# Patient Record
Sex: MALE | Race: WHITE | NOT HISPANIC OR LATINO | Employment: FULL TIME | URBAN - METROPOLITAN AREA
[De-identification: names, ages, dates, MRNs, and addresses within clinical notes are randomized per-mention and may not be internally consistent; named-entity substitution may affect disease eponyms.]

---

## 2021-12-08 RX ORDER — SACUBITRIL AND VALSARTAN 49; 51 MG/1; MG/1
1 TABLET, FILM COATED ORAL 2 TIMES DAILY
COMMUNITY
End: 2021-12-08 | Stop reason: SDUPTHER

## 2021-12-08 RX ORDER — ROSUVASTATIN CALCIUM 40 MG/1
10 TABLET, COATED ORAL NIGHTLY
COMMUNITY
End: 2021-12-08 | Stop reason: SDUPTHER

## 2021-12-08 RX ORDER — CARVEDILOL 6.25 MG/1
6.25 TABLET ORAL 2 TIMES DAILY
Qty: 180 TABLET | Refills: 3 | Status: SHIPPED | OUTPATIENT
Start: 2021-12-08 | End: 2022-01-03 | Stop reason: SDUPTHER

## 2021-12-08 RX ORDER — CARVEDILOL 6.25 MG/1
6.25 TABLET ORAL 2 TIMES DAILY
COMMUNITY
End: 2021-12-08 | Stop reason: SDUPTHER

## 2021-12-08 RX ORDER — ROSUVASTATIN CALCIUM 40 MG/1
40 TABLET, COATED ORAL NIGHTLY
Qty: 90 TABLET | Refills: 3 | Status: SHIPPED | OUTPATIENT
Start: 2021-12-08 | End: 2022-01-03 | Stop reason: SDUPTHER

## 2021-12-08 RX ORDER — FUROSEMIDE 40 MG/1
40 TABLET ORAL 2 TIMES DAILY
COMMUNITY
End: 2021-12-08 | Stop reason: SDUPTHER

## 2021-12-08 RX ORDER — FUROSEMIDE 40 MG/1
40 TABLET ORAL 2 TIMES DAILY
Qty: 180 TABLET | Refills: 3 | Status: SHIPPED | OUTPATIENT
Start: 2021-12-08 | End: 2022-01-03 | Stop reason: SDUPTHER

## 2021-12-08 RX ORDER — SACUBITRIL AND VALSARTAN 49; 51 MG/1; MG/1
1 TABLET, FILM COATED ORAL 2 TIMES DAILY
Qty: 180 TABLET | Refills: 3 | Status: SHIPPED | OUTPATIENT
Start: 2021-12-08 | End: 2022-01-03 | Stop reason: SDUPTHER

## 2021-12-08 RX ORDER — LANOLIN ALCOHOL/MO/W.PET/CERES
400 CREAM (GRAM) TOPICAL DAILY
Qty: 90 TABLET | Refills: 3 | Status: SHIPPED | OUTPATIENT
Start: 2021-12-08 | End: 2022-01-03

## 2021-12-08 RX ORDER — LANOLIN ALCOHOL/MO/W.PET/CERES
400 CREAM (GRAM) TOPICAL DAILY
COMMUNITY
End: 2021-12-08 | Stop reason: SDUPTHER

## 2021-12-08 NOTE — TELEPHONE ENCOUNTER
----- Message from Omar Saucedo sent at 12/8/2021  8:28 AM CST -----  Contact: pt  Pt asking for refills on his meds he is out of his entrestor please call back to advise he was last seen at dr garza [facility] location   [phone]  [facility] [address] [phone]

## 2022-01-03 ENCOUNTER — OFFICE VISIT (OUTPATIENT)
Dept: CARDIOLOGY | Facility: CLINIC | Age: 52
End: 2022-01-03
Payer: COMMERCIAL

## 2022-01-03 VITALS
HEIGHT: 68 IN | WEIGHT: 221 LBS | HEART RATE: 105 BPM | RESPIRATION RATE: 16 BRPM | BODY MASS INDEX: 33.49 KG/M2 | DIASTOLIC BLOOD PRESSURE: 74 MMHG | OXYGEN SATURATION: 97 % | SYSTOLIC BLOOD PRESSURE: 118 MMHG

## 2022-01-03 DIAGNOSIS — I45.4 IVCD (INTRAVENTRICULAR CONDUCTION DEFECT): ICD-10-CM

## 2022-01-03 DIAGNOSIS — G47.33 OSA (OBSTRUCTIVE SLEEP APNEA): ICD-10-CM

## 2022-01-03 DIAGNOSIS — I10 HYPERTENSION, UNSPECIFIED TYPE: Primary | ICD-10-CM

## 2022-01-03 DIAGNOSIS — C44.1192 BASAL CELL CARCINOMA (BCC) OF SKIN OF LEFT LOWER EYELID INCLUDING CANTHUS: ICD-10-CM

## 2022-01-03 DIAGNOSIS — I42.0 CONGESTIVE CARDIOMYOPATHY: ICD-10-CM

## 2022-01-03 DIAGNOSIS — I48.0 PAROXYSMAL ATRIAL FIBRILLATION: ICD-10-CM

## 2022-01-03 DIAGNOSIS — I63.9 CEREBROVASCULAR ACCIDENT (CVA), UNSPECIFIED MECHANISM: ICD-10-CM

## 2022-01-03 PROCEDURE — 93000 EKG 12-LEAD: ICD-10-PCS | Mod: S$GLB,,, | Performed by: GENERAL PRACTICE

## 2022-01-03 PROCEDURE — 99203 OFFICE O/P NEW LOW 30 MIN: CPT | Mod: S$GLB,,, | Performed by: GENERAL PRACTICE

## 2022-01-03 PROCEDURE — 99203 PR OFFICE/OUTPT VISIT, NEW, LEVL III, 30-44 MIN: ICD-10-PCS | Mod: S$GLB,,, | Performed by: GENERAL PRACTICE

## 2022-01-03 PROCEDURE — 93000 ELECTROCARDIOGRAM COMPLETE: CPT | Mod: S$GLB,,, | Performed by: GENERAL PRACTICE

## 2022-01-03 RX ORDER — FUROSEMIDE 40 MG/1
40 TABLET ORAL 2 TIMES DAILY
Qty: 180 TABLET | Refills: 3 | Status: SHIPPED | OUTPATIENT
Start: 2022-01-03 | End: 2023-02-10

## 2022-01-03 RX ORDER — LANOLIN ALCOHOL/MO/W.PET/CERES
100 CREAM (GRAM) TOPICAL DAILY
COMMUNITY

## 2022-01-03 RX ORDER — LANOLIN ALCOHOL/MO/W.PET/CERES
400 CREAM (GRAM) TOPICAL DAILY
Qty: 30 TABLET | Refills: 3 | Status: SHIPPED | OUTPATIENT
Start: 2022-01-03 | End: 2022-04-03

## 2022-01-03 RX ORDER — UBIDECARENONE 30 MG
100 CAPSULE ORAL DAILY
Qty: 90 CAPSULE | Refills: 3 | Status: SHIPPED | OUTPATIENT
Start: 2022-01-03 | End: 2023-05-12 | Stop reason: SDUPTHER

## 2022-01-03 RX ORDER — ROSUVASTATIN CALCIUM 40 MG/1
40 TABLET, COATED ORAL NIGHTLY
Qty: 90 TABLET | Refills: 3 | Status: SHIPPED | OUTPATIENT
Start: 2022-01-03 | End: 2023-02-10

## 2022-01-03 RX ORDER — MULTIVITAMIN
1 TABLET ORAL DAILY
COMMUNITY

## 2022-01-03 RX ORDER — SACUBITRIL AND VALSARTAN 49; 51 MG/1; MG/1
1 TABLET, FILM COATED ORAL 2 TIMES DAILY
Qty: 180 TABLET | Refills: 3 | Status: SHIPPED | OUTPATIENT
Start: 2022-01-03 | End: 2023-02-10

## 2022-01-03 RX ORDER — SPIRONOLACTONE 50 MG/1
50 TABLET, FILM COATED ORAL DAILY
COMMUNITY
End: 2022-06-20

## 2022-01-03 RX ORDER — UBIDECARENONE 30 MG
30 CAPSULE ORAL 3 TIMES DAILY
COMMUNITY
End: 2022-01-03 | Stop reason: SDUPTHER

## 2022-01-03 RX ORDER — LANOLIN ALCOHOL/MO/W.PET/CERES
400 CREAM (GRAM) TOPICAL DAILY
COMMUNITY
End: 2022-01-03 | Stop reason: SDUPTHER

## 2022-01-03 RX ORDER — ASPIRIN 325 MG
325 TABLET ORAL DAILY
COMMUNITY
End: 2023-04-25

## 2022-01-03 RX ORDER — CARVEDILOL 6.25 MG/1
6.25 TABLET ORAL 2 TIMES DAILY
Qty: 180 TABLET | Refills: 3 | Status: SHIPPED | OUTPATIENT
Start: 2022-01-03 | End: 2023-02-10

## 2022-01-03 NOTE — PROGRESS NOTES
Subjective:    Patient ID:  Sher Ladd is a 51 y.o. male who presents for follow-up of   Chief Complaint   Patient presents with    Hypertension       HPI:  History of infectious AFIB and CVA, CMO, CHF. He had restoration of NSR and LVEF.   Had basal cell carcinoma on chest removed and one on face next month.        Review of patient's allergies indicates:  No Known Allergies    No past medical history on file.  No past surgical history on file.  Social History     Tobacco Use    Smoking status: Never Smoker    Smokeless tobacco: Never Used   Substance Use Topics    Alcohol use: Yes    Drug use: Never     No family history on file.     Review of Systems:   Constitution: Negative for diaphoresis and fever.   HEENT: Negative for nosebleeds.    Cardiovascular: Negative for chest pain       No dyspnea on exertion       No leg swelling        No palpitations  Respiratory: Negative for shortness of breath and wheezing.    Hematologic/Lymphatic: Negative for bleeding problem. Does not bruise/bleed easily.   Skin: Negative for color change and rash.   Musculoskeletal: Negative for falls and myalgias.   Gastrointestinal: Negative for hematemesis and hematochezia.   Genitourinary: Negative for hematuria.   Neurological: Negative for dizziness and light-headedness.   Psychiatric/Behavioral: Negative for altered mental status and memory loss.          Objective:        Vitals:    01/03/22 1445   BP: 118/74   Pulse: 105   Resp: 16       No results found for: WBC, HGB, HCT, PLT, CHOL, TRIG, HDL, LDLDIRECT, ALT, AST, NA, K, CL, CREATININE, BUN, CO2, TSH, PSA, INR, GLUF, HGBA1C, MICROALBUR     ECHOCARDIOGRAM RESULTS  No results found for this or any previous visit.        CURRENT/PREVIOUS VISIT EKG  No results found for this or any previous visit.  No valid procedures specified.   No results found for this or any previous visit.      Physical Exam:  CONSTITUTIONAL: No fever, no chills  HEENT: Normocephalic, atraumatic,pupils  reactive to light                 NECK:  No JVD no carotid bruit  CVS: S1S2+, RRR, no murmurs,   LUNGS: Clear  ABDOMEN: Soft, NT, BS+  EXTREMITIES: No cyanosis, edema  : No rivera catheter  NEURO: AAO X 3  PSY: Normal affect      Medication List with Changes/Refills   Current Medications    ASPIRIN 325 MG TABLET    Take 325 mg by mouth once daily.    CALCIUM CARBONATE/VITAMIN D3 (VITAMIN D-3 ORAL)    Take 5,000 each by mouth once daily.    CYANOCOBALAMIN (VITAMIN B-12) 1000 MCG TABLET    Take 100 mcg by mouth once daily.    MULTIVITAMIN (THERAGRAN) PER TABLET    Take 1 tablet by mouth once daily.    SPIRONOLACTONE (ALDACTONE) 50 MG TABLET    Take 50 mg by mouth once daily.   Changed and/or Refilled Medications    Modified Medication Previous Medication    CARVEDILOL (COREG) 6.25 MG TABLET carvediloL (COREG) 6.25 MG tablet       Take 1 tablet (6.25 mg total) by mouth 2 (two) times daily.    Take 1 tablet (6.25 mg total) by mouth 2 (two) times daily.    CO-ENZYME Q-10 30 MG CAPSULE co-enzyme Q-10 30 mg capsule       Take 3 capsules (90 mg total) by mouth once daily.    Take 30 mg by mouth 3 (three) times daily.    FUROSEMIDE (LASIX) 40 MG TABLET furosemide (LASIX) 40 MG tablet       Take 1 tablet (40 mg total) by mouth 2 (two) times daily.    Take 1 tablet (40 mg total) by mouth 2 (two) times daily.    MAGNESIUM OXIDE (MAGOX) 400 MG (241.3 MG MAGNESIUM) TABLET magnesium oxide (MAGOX) 400 mg (241.3 mg magnesium) tablet       Take 1 tablet (400 mg total) by mouth once daily.    Take 400 mg by mouth once daily.    ROSUVASTATIN (CRESTOR) 40 MG TAB rosuvastatin (CRESTOR) 40 MG Tab       Take 1 tablet (40 mg total) by mouth every evening.    Take 1 tablet (40 mg total) by mouth every evening.    SACUBITRIL-VALSARTAN (ENTRESTO) 49-51 MG PER TABLET sacubitriL-valsartan (ENTRESTO) 49-51 mg per tablet       Take 1 tablet by mouth 2 (two) times daily.    Take 1 tablet by mouth 2 (two) times daily.   Discontinued Medications     MAGNESIUM OXIDE (MAG-OX) 400 MG (241.3 MG MAGNESIUM) TABLET    Take 1 tablet (400 mg total) by mouth once daily.             Assessment:       1. Hypertension, unspecified type    2. Paroxysmal atrial fibrillation    3. Congestive cardiomyopathy    4. Cerebrovascular accident (CVA), unspecified mechanism    5. IVCD (intraventricular conduction defect)    6. BMI 33.0-33.9,adult    7. KEENAN (obstructive sleep apnea)         Plan:     Problem List Items Addressed This Visit    None     Visit Diagnoses     Hypertension, unspecified type    -  Primary    Relevant Orders    IN OFFICE EKG 12-LEAD (to [facility])    TSH    Lipid Panel    Hemoglobin A1C    CBC Without Differential    Comprehensive Metabolic Panel    Echo    Paroxysmal atrial fibrillation        Relevant Orders    TSH    Lipid Panel    Hemoglobin A1C    CBC Without Differential    Comprehensive Metabolic Panel    Echo    Congestive cardiomyopathy        Relevant Orders    TSH    Lipid Panel    Hemoglobin A1C    CBC Without Differential    Comprehensive Metabolic Panel    Echo    Cerebrovascular accident (CVA), unspecified mechanism        Relevant Orders    TSH    Lipid Panel    Hemoglobin A1C    CBC Without Differential    Comprehensive Metabolic Panel    IVCD (intraventricular conduction defect)        Relevant Orders    TSH    Lipid Panel    Hemoglobin A1C    CBC Without Differential    Comprehensive Metabolic Panel    BMI 33.0-33.9,adult        Relevant Orders    TSH    Lipid Panel    Hemoglobin A1C    CBC Without Differential    Comprehensive Metabolic Panel    KEENAN (obstructive sleep apnea)        Relevant Orders    Ambulatory referral/consult to Sleep Disorders    Echo        Echo for LV function baseline. Last ECHO ,<- 1 year.   6 month bloodwork.    Cardiomyopathy ,TACHYCARDIC SECONDARY TO ATRIAL FIBRILLATION with embolic CVA.  FC 1  No CHF, CP    CHADS VASC 2 =4 DISCUSSED NOAC with patient. He doesn't want to restart new medicines.          Follow up in  about 6 months (around 7/3/2022).    The patients questions were answered, they verbalized understanding, and agreed with the treatment plan.     SILVA PATTERSON MD  [facility]

## 2022-06-04 ENCOUNTER — TELEPHONE (OUTPATIENT)
Dept: CARDIOLOGY | Facility: CLINIC | Age: 52
End: 2022-06-04
Payer: COMMERCIAL

## 2022-06-04 NOTE — TELEPHONE ENCOUNTER
Left message for patient that echo had to be reschedule from 6/16 to 7/7/22 due to no tech that day.

## 2022-06-20 ENCOUNTER — TELEPHONE (OUTPATIENT)
Dept: CARDIOLOGY | Facility: CLINIC | Age: 52
End: 2022-06-20
Payer: COMMERCIAL

## 2023-01-18 ENCOUNTER — LAB VISIT (OUTPATIENT)
Dept: FAMILY MEDICINE | Facility: CLINIC | Age: 53
End: 2023-01-18
Payer: COMMERCIAL

## 2023-01-18 DIAGNOSIS — I63.9 CEREBROVASCULAR ACCIDENT (CVA), UNSPECIFIED MECHANISM: ICD-10-CM

## 2023-01-18 DIAGNOSIS — I48.0 PAROXYSMAL ATRIAL FIBRILLATION: ICD-10-CM

## 2023-01-18 DIAGNOSIS — I10 HYPERTENSION, UNSPECIFIED TYPE: ICD-10-CM

## 2023-01-18 DIAGNOSIS — I42.0 CONGESTIVE CARDIOMYOPATHY: ICD-10-CM

## 2023-01-18 DIAGNOSIS — I45.4 IVCD (INTRAVENTRICULAR CONDUCTION DEFECT): ICD-10-CM

## 2023-01-18 LAB
ALBUMIN SERPL BCP-MCNC: 4.4 G/DL (ref 3.5–5.2)
ALP SERPL-CCNC: 52 U/L (ref 55–135)
ALT SERPL W/O P-5'-P-CCNC: 35 U/L (ref 10–44)
ANION GAP SERPL CALC-SCNC: 12 MMOL/L (ref 8–16)
AST SERPL-CCNC: 21 U/L (ref 10–40)
BILIRUB SERPL-MCNC: 0.5 MG/DL (ref 0.1–1)
BUN SERPL-MCNC: 15 MG/DL (ref 6–20)
CALCIUM SERPL-MCNC: 9.8 MG/DL (ref 8.7–10.5)
CHLORIDE SERPL-SCNC: 104 MMOL/L (ref 95–110)
CHOLEST SERPL-MCNC: 176 MG/DL (ref 120–199)
CHOLEST/HDLC SERPL: 3.7 {RATIO} (ref 2–5)
CO2 SERPL-SCNC: 26 MMOL/L (ref 23–29)
CREAT SERPL-MCNC: 1 MG/DL (ref 0.5–1.4)
ERYTHROCYTE [DISTWIDTH] IN BLOOD BY AUTOMATED COUNT: 13.2 % (ref 11.5–14.5)
EST. GFR  (NO RACE VARIABLE): >60 ML/MIN/1.73 M^2
ESTIMATED AVG GLUCOSE: 131 MG/DL (ref 68–131)
GLUCOSE SERPL-MCNC: 128 MG/DL (ref 70–110)
HBA1C MFR BLD: 6.2 % (ref 4–5.6)
HCT VFR BLD AUTO: 45.8 % (ref 40–54)
HDLC SERPL-MCNC: 48 MG/DL (ref 40–75)
HDLC SERPL: 27.3 % (ref 20–50)
HGB BLD-MCNC: 14.8 G/DL (ref 14–18)
LDLC SERPL CALC-MCNC: 101.6 MG/DL (ref 63–159)
MCH RBC QN AUTO: 28.8 PG (ref 27–31)
MCHC RBC AUTO-ENTMCNC: 32.3 G/DL (ref 32–36)
MCV RBC AUTO: 89 FL (ref 82–98)
NONHDLC SERPL-MCNC: 128 MG/DL
PLATELET # BLD AUTO: 377 K/UL (ref 150–450)
PMV BLD AUTO: 9.6 FL (ref 9.2–12.9)
POTASSIUM SERPL-SCNC: 4.5 MMOL/L (ref 3.5–5.1)
PROT SERPL-MCNC: 7.6 G/DL (ref 6–8.4)
RBC # BLD AUTO: 5.13 M/UL (ref 4.6–6.2)
SODIUM SERPL-SCNC: 142 MMOL/L (ref 136–145)
TRIGL SERPL-MCNC: 132 MG/DL (ref 30–150)
TSH SERPL DL<=0.005 MIU/L-ACNC: 3.43 UIU/ML (ref 0.4–4)
WBC # BLD AUTO: 6.51 K/UL (ref 3.9–12.7)

## 2023-01-18 PROCEDURE — 84443 ASSAY THYROID STIM HORMONE: CPT | Performed by: GENERAL PRACTICE

## 2023-01-18 PROCEDURE — 83036 HEMOGLOBIN GLYCOSYLATED A1C: CPT | Performed by: GENERAL PRACTICE

## 2023-01-18 PROCEDURE — 80061 LIPID PANEL: CPT | Performed by: GENERAL PRACTICE

## 2023-01-18 PROCEDURE — 85027 COMPLETE CBC AUTOMATED: CPT | Performed by: GENERAL PRACTICE

## 2023-01-18 PROCEDURE — 80053 COMPREHEN METABOLIC PANEL: CPT | Performed by: GENERAL PRACTICE

## 2023-01-25 ENCOUNTER — OFFICE VISIT (OUTPATIENT)
Dept: CARDIOLOGY | Facility: CLINIC | Age: 53
End: 2023-01-25
Payer: COMMERCIAL

## 2023-01-25 VITALS
WEIGHT: 232.5 LBS | BODY MASS INDEX: 35.35 KG/M2 | HEART RATE: 98 BPM | SYSTOLIC BLOOD PRESSURE: 104 MMHG | DIASTOLIC BLOOD PRESSURE: 74 MMHG | OXYGEN SATURATION: 96 %

## 2023-01-25 DIAGNOSIS — I42.0 CONGESTIVE CARDIOMYOPATHY: ICD-10-CM

## 2023-01-25 DIAGNOSIS — I45.4 IVCD (INTRAVENTRICULAR CONDUCTION DEFECT): ICD-10-CM

## 2023-01-25 DIAGNOSIS — R73.03 PREDIABETES: ICD-10-CM

## 2023-01-25 DIAGNOSIS — I10 PRIMARY HYPERTENSION: ICD-10-CM

## 2023-01-25 DIAGNOSIS — I48.0 PAROXYSMAL ATRIAL FIBRILLATION: ICD-10-CM

## 2023-01-25 DIAGNOSIS — L40.9 PSORIASIS (A TYPE OF SKIN INFLAMMATION): ICD-10-CM

## 2023-01-25 DIAGNOSIS — K21.9 GASTROESOPHAGEAL REFLUX DISEASE WITHOUT ESOPHAGITIS: Primary | ICD-10-CM

## 2023-01-25 DIAGNOSIS — I63.9 CEREBROVASCULAR ACCIDENT (CVA), UNSPECIFIED MECHANISM: ICD-10-CM

## 2023-01-25 PROCEDURE — 1160F RVW MEDS BY RX/DR IN RCRD: CPT | Mod: CPTII,S$GLB,, | Performed by: GENERAL PRACTICE

## 2023-01-25 PROCEDURE — 1159F PR MEDICATION LIST DOCUMENTED IN MEDICAL RECORD: ICD-10-PCS | Mod: CPTII,S$GLB,, | Performed by: GENERAL PRACTICE

## 2023-01-25 PROCEDURE — 1160F PR REVIEW ALL MEDS BY PRESCRIBER/CLIN PHARMACIST DOCUMENTED: ICD-10-PCS | Mod: CPTII,S$GLB,, | Performed by: GENERAL PRACTICE

## 2023-01-25 PROCEDURE — 1159F MED LIST DOCD IN RCRD: CPT | Mod: CPTII,S$GLB,, | Performed by: GENERAL PRACTICE

## 2023-01-25 PROCEDURE — 3008F PR BODY MASS INDEX (BMI) DOCUMENTED: ICD-10-PCS | Mod: CPTII,S$GLB,, | Performed by: GENERAL PRACTICE

## 2023-01-25 PROCEDURE — 99214 PR OFFICE/OUTPT VISIT, EST, LEVL IV, 30-39 MIN: ICD-10-PCS | Mod: S$GLB,,, | Performed by: GENERAL PRACTICE

## 2023-01-25 PROCEDURE — 3008F BODY MASS INDEX DOCD: CPT | Mod: CPTII,S$GLB,, | Performed by: GENERAL PRACTICE

## 2023-01-25 PROCEDURE — 3078F DIAST BP <80 MM HG: CPT | Mod: CPTII,S$GLB,, | Performed by: GENERAL PRACTICE

## 2023-01-25 PROCEDURE — 3078F PR MOST RECENT DIASTOLIC BLOOD PRESSURE < 80 MM HG: ICD-10-PCS | Mod: CPTII,S$GLB,, | Performed by: GENERAL PRACTICE

## 2023-01-25 PROCEDURE — 3074F SYST BP LT 130 MM HG: CPT | Mod: CPTII,S$GLB,, | Performed by: GENERAL PRACTICE

## 2023-01-25 PROCEDURE — 93000 EKG 12-LEAD: ICD-10-PCS | Mod: S$GLB,,, | Performed by: INTERNAL MEDICINE

## 2023-01-25 PROCEDURE — 3074F PR MOST RECENT SYSTOLIC BLOOD PRESSURE < 130 MM HG: ICD-10-PCS | Mod: CPTII,S$GLB,, | Performed by: GENERAL PRACTICE

## 2023-01-25 PROCEDURE — 99999 PR PBB SHADOW E&M-EST. PATIENT-LVL III: ICD-10-PCS | Mod: PBBFAC,,, | Performed by: GENERAL PRACTICE

## 2023-01-25 PROCEDURE — 93000 ELECTROCARDIOGRAM COMPLETE: CPT | Mod: S$GLB,,, | Performed by: INTERNAL MEDICINE

## 2023-01-25 PROCEDURE — 99214 OFFICE O/P EST MOD 30 MIN: CPT | Mod: S$GLB,,, | Performed by: GENERAL PRACTICE

## 2023-01-25 PROCEDURE — 99999 PR PBB SHADOW E&M-EST. PATIENT-LVL III: CPT | Mod: PBBFAC,,, | Performed by: GENERAL PRACTICE

## 2023-01-25 PROCEDURE — 3044F HG A1C LEVEL LT 7.0%: CPT | Mod: CPTII,S$GLB,, | Performed by: GENERAL PRACTICE

## 2023-01-25 PROCEDURE — 3044F PR MOST RECENT HEMOGLOBIN A1C LEVEL <7.0%: ICD-10-PCS | Mod: CPTII,S$GLB,, | Performed by: GENERAL PRACTICE

## 2023-01-25 RX ORDER — MELOXICAM 15 MG/1
15 TABLET ORAL
COMMUNITY
Start: 2021-12-31 | End: 2023-04-25

## 2023-01-25 RX ORDER — MOMETASONE FUROATE 1 MG/G
OINTMENT TOPICAL DAILY
COMMUNITY
Start: 2023-01-25 | End: 2023-01-25 | Stop reason: SDUPTHER

## 2023-01-25 RX ORDER — OMEPRAZOLE 40 MG/1
40 CAPSULE, DELAYED RELEASE ORAL DAILY
Qty: 30 CAPSULE | Refills: 11 | Status: SHIPPED | OUTPATIENT
Start: 2023-01-25 | End: 2023-02-02

## 2023-01-25 RX ORDER — MOMETASONE FUROATE 1 MG/G
OINTMENT TOPICAL DAILY
Qty: 45 G | Refills: 2 | Status: SHIPPED | OUTPATIENT
Start: 2023-01-25

## 2023-01-25 NOTE — PROGRESS NOTES
Subjective:    Patient ID:  Uday Ladd is a 52 y.o. male who presents for follow-up of No chief complaint on file.      HPI:  History of infectious AFIB and CVA, CMO, CHF. He had restoration of NSR and LVEF.   Had basal cell carcinoma on chest removed and one on face next month.          Echo for LV function baseline. Last ECHO ,<- 1 year.   6 month bloodwork.     Cardiomyopathy ,TACHYCARDIC SECONDARY TO ATRIAL FIBRILLATION with embolic CVA.  FC 1  No CHF, CP     CHADS VASC 2 =4 DISCUSSED NOAC with patient. He doesn't want to restart new medicines.       1/25/23    Uday Ladd is a 45 y.o. male who presents WITH CHF, IDIOPATHIC CMO AFTER FLU symptoms.Had apical thrombus with cva symptoms treated with antocoagulan greater than 6 months agot . Now admitted for catherization to [facility] of CHF, cardiomyopathy.INITIAL HP LOST. This is redo, some details may be inaccurate..  hE HAS SOME RECOVERY OF LVEF, BUT ANT APICAL AKINESIA ON ECHO C/W CAD VS EMBOLIC EVENT.Thrombus resolved on echo.    Last heart cath 2015 [place].    EKG NSR IVCD.     Right sided chest pains. SOB AT NIGHT WHEN LAYS DOWN. Gained 20 pounds in few weeks. Concerned about fluid.   ACID REFLUX severe food sticks in chest.6 months.      Review of patient's allergies indicates:  No Known Allergies    No past medical history on file.  No past surgical history on file.  Social History     Tobacco Use    Smoking status: Never    Smokeless tobacco: Never   Substance Use Topics    Alcohol use: Yes    Drug use: Never     No family history on file.     Review of Systems:   Constitution: Negative for diaphoresis and fever.   HEENT: Negative for nosebleeds.    Cardiovascular: Negative for chest pain       No dyspnea on exertion       No leg swelling        No palpitations  Respiratory: Negative for shortness of breath and wheezing.    Hematologic/Lymphatic: Negative for bleeding problem. Does not bruise/bleed easily.   Skin: Negative for color change  and rash.   Musculoskeletal: Negative for falls and myalgias.   Gastrointestinal: Negative for hematemesis and hematochezia.   Genitourinary: Negative for hematuria.   Neurological: Negative for dizziness and light-headedness.   Psychiatric/Behavioral: Negative for altered mental status and memory loss.          Objective:        There were no vitals filed for this visit.    Lab Results   Component Value Date    WBC 6.51 01/18/2023    HGB 14.8 01/18/2023    HCT 45.8 01/18/2023     01/18/2023    CHOL 176 01/18/2023    TRIG 132 01/18/2023    HDL 48 01/18/2023    ALT 35 01/18/2023    AST 21 01/18/2023     01/18/2023    K 4.5 01/18/2023     01/18/2023    CREATININE 1.0 01/18/2023    BUN 15 01/18/2023    CO2 26 01/18/2023    TSH 3.433 01/18/2023    HGBA1C 6.2 (H) 01/18/2023          ECHOCARDIOGRAM RESULTS  No results found for this or any previous visit.        CURRENT/PREVIOUS VISIT EKG  Results for orders placed or performed in visit on 01/03/22   IN OFFICE EKG 12-LEAD (to [place])    Collection Time: 01/03/22  2:38 PM    Narrative    Test Reason : I10,    Vent. Rate : 106 BPM     Atrial Rate : 106 BPM     P-R Int : 166 ms          QRS Dur : 126 ms      QT Int : 360 ms       P-R-T Axes : 059 -29 067 degrees     QTc Int : 478 ms    Sinus tachycardia  Nonspecific intraventricular block  Abnormal ECG  No previous ECGs available  Confirmed by Kiersten MEJIA, Librado MELENDEZ ([id number]) on 1/11/2022 8:53:07 PM    Referred By:  KIERSTEN           Confirmed By:Librado Burhc MD     No valid procedures specified.   No results found for this or any previous visit.      Physical Exam:  CONSTITUTIONAL: No fever, no chills  HEENT: Normocephalic, atraumatic,pupils reactive to light                 NECK:  No JVD no carotid bruit  CVS: S1S2+, RRR, no murmurs,   LUNGS: Clear  ABDOMEN: Soft, NT, BS+  EXTREMITIES: No cyanosis, edema  : No rivera catheter  NEURO: AAO X 3  PSY: Normal affect      Medication List with Changes/Refills    Current Medications    ASPIRIN 325 MG TABLET    Take 325 mg by mouth once daily.    CALCIUM CARBONATE/VITAMIN D3 (VITAMIN D-3 ORAL)    Take 5,000 each by mouth once daily.    CARVEDILOL (COREG) 6.25 MG TABLET    Take 1 tablet (6.25 mg total) by mouth 2 (two) times daily.    CO-ENZYME Q-10 30 MG CAPSULE    Take 3 capsules (90 mg total) by mouth once daily.    CYANOCOBALAMIN (VITAMIN B-12) 1000 MCG TABLET    Take 100 mcg by mouth once daily.    FUROSEMIDE (LASIX) 40 MG TABLET    Take 1 tablet (40 mg total) by mouth 2 (two) times daily.    MULTIVITAMIN (THERAGRAN) PER TABLET    Take 1 tablet by mouth once daily.    ROSUVASTATIN (CRESTOR) 40 MG TAB    Take 1 tablet (40 mg total) by mouth every evening.    SACUBITRIL-VALSARTAN (ENTRESTO) 49-51 MG PER TABLET    Take 1 tablet by mouth 2 (two) times daily.    SPIRONOLACTONE (ALDACTONE) 50 MG TABLET    TAKE 1 TABLET BY MOUTH EVERY DAY             Assessment:       1. Primary hypertension    2. Paroxysmal atrial fibrillation    3. Congestive cardiomyopathy    4. Cerebrovascular accident (CVA), unspecified mechanism    5. IVCD (intraventricular conduction defect)    6. BMI 33.0-33.9,adult         Plan:     Problem List Items Addressed This Visit    None  Visit Diagnoses       Primary hypertension    -  Primary    Paroxysmal atrial fibrillation        Congestive cardiomyopathy        Cerebrovascular accident (CVA), unspecified mechanism        IVCD (intraventricular conduction defect)        BMI 33.0-33.9,adult              [facility]  Stress , echo BNP  OMEPRAZOLE  No follow-ups on file.    The patients questions were answered, they verbalized understanding, and agreed with the treatment plan.     SILVA PATTERSON MD  [facility]

## 2023-02-09 ENCOUNTER — TELEPHONE (OUTPATIENT)
Dept: CARDIOLOGY | Facility: HOSPITAL | Age: 53
End: 2023-02-09

## 2023-02-09 NOTE — TELEPHONE ENCOUNTER
Patient advised, test will be at [facility] ([address] [facility]   Will need to register on the first floor at the main entrance.   Patient advised that arrival time is 6:30am.  Patient advised that he may be here about 3.5-4 hours, and may want to bring something to occupy their time, as there will be periods of waiting.    Patient advised, may take his medications prior to testing if you need to.   Advised if he needs to eat to take his medications, please keep it light, like toast and juice.    Patient advised to avoid all caffeine 12 hours prior to testing.  This includes decaf tea and coffee.    Will provide peanut butter crackers for a snack after stress test.  If patient would prefer something else, please bring a snack from home.    Wear comfortable clothing.   No lotions, oils, or powders to the upper chest area. May wear deodorant.    No metal jewelry, buttons, or zippers to the upper body.  Patient verbalizes understanding of instructions.

## 2023-02-10 ENCOUNTER — HOSPITAL ENCOUNTER (OUTPATIENT)
Dept: CARDIOLOGY | Facility: HOSPITAL | Age: 53
Discharge: HOME | End: 2023-02-10
Attending: GENERAL PRACTICE
Payer: COMMERCIAL

## 2023-02-10 ENCOUNTER — HOSPITAL ENCOUNTER (OUTPATIENT)
Dept: RADIOLOGY | Facility: HOSPITAL | Age: 53
Discharge: HOME | End: 2023-02-10
Attending: GENERAL PRACTICE
Payer: COMMERCIAL

## 2023-02-10 VITALS — HEIGHT: 68 IN | WEIGHT: 232 LBS | BODY MASS INDEX: 35.16 KG/M2

## 2023-02-10 DIAGNOSIS — I10 PRIMARY HYPERTENSION: ICD-10-CM

## 2023-02-10 DIAGNOSIS — I63.9 CEREBROVASCULAR ACCIDENT (CVA), UNSPECIFIED MECHANISM: ICD-10-CM

## 2023-02-10 DIAGNOSIS — I48.0 PAROXYSMAL ATRIAL FIBRILLATION: ICD-10-CM

## 2023-02-10 DIAGNOSIS — I42.0 CONGESTIVE CARDIOMYOPATHY: ICD-10-CM

## 2023-02-10 DIAGNOSIS — I10 PRIMARY HYPERTENSION: Primary | ICD-10-CM

## 2023-02-10 LAB
AORTIC ROOT ANNULUS: 3.3 CM
AORTIC VALVE CUSP SEPERATION: 2.1 CM
AV INDEX (PROSTH): 0.73
AV MEAN GRADIENT: 3 MMHG
AV PEAK GRADIENT: 6 MMHG
AV VALVE AREA: 3.03 CM2
AV VELOCITY RATIO: 0.71
BSA FOR ECHO PROCEDURE: 2.25 M2
CV ECHO LV RWT: 0.5 CM
DOP CALC AO PEAK VEL: 1.19 M/S
DOP CALC AO VTI: 22.2 CM
DOP CALC LVOT AREA: 4.2 CM2
DOP CALC LVOT DIAMETER: 2.3 CM
DOP CALC LVOT PEAK VEL: 0.85 M/S
DOP CALC LVOT STROKE VOLUME: 67.27 CM3
DOP CALCLVOT PEAK VEL VTI: 16.2 CM
E WAVE DECELERATION TIME: 169 MSEC
E/A RATIO: 0.97
E/E' RATIO: 7.44 M/S
ECHO LV POSTERIOR WALL: 1.2 CM (ref 0.6–1.1)
EJECTION FRACTION: 35 %
FRACTIONAL SHORTENING: 18 % (ref 28–44)
INTERVENTRICULAR SEPTUM: 0.89 CM (ref 0.6–1.1)
IVRT: 100 MSEC
LEFT ATRIUM SIZE: 4.3 CM
LEFT INTERNAL DIMENSION IN SYSTOLE: 3.97 CM (ref 2.1–4)
LEFT VENTRICLE DIASTOLIC VOLUME INDEX: 50 ML/M2
LEFT VENTRICLE DIASTOLIC VOLUME: 109 ML
LEFT VENTRICLE MASS INDEX: 83 G/M2
LEFT VENTRICLE SYSTOLIC VOLUME INDEX: 31.6 ML/M2
LEFT VENTRICLE SYSTOLIC VOLUME: 68.8 ML
LEFT VENTRICULAR INTERNAL DIMENSION IN DIASTOLE: 4.82 CM (ref 3.5–6)
LEFT VENTRICULAR MASS: 181.94 G
LV LATERAL E/E' RATIO: 5.58 M/S
LV SEPTAL E/E' RATIO: 11.17 M/S
LVOT MG: 1 MMHG
LVOT MV: 0.55 CM/S
MV PEAK A VEL: 0.69 M/S
MV PEAK E VEL: 0.67 M/S
MV STENOSIS PRESSURE HALF TIME: 57 MS
MV VALVE AREA P 1/2 METHOD: 3.86 CM2
PISA TR MAX VEL: 1.88 M/S
RA PRESSURE: 3 MMHG
RIGHT VENTRICULAR END-DIASTOLIC DIMENSION: 2.13 CM
TDI LATERAL: 0.12 M/S
TDI SEPTAL: 0.06 M/S
TDI: 0.09 M/S
TR MAX PG: 14 MMHG
TV REST PULMONARY ARTERY PRESSURE: 17 MMHG

## 2023-02-10 PROCEDURE — 93017 CV STRESS TEST TRACING ONLY: CPT

## 2023-02-10 PROCEDURE — 93016 PR CARDIAC STRESS TST,DR SUPERV ONLY: ICD-10-PCS | Mod: ,,, | Performed by: NURSE PRACTITIONER

## 2023-02-10 PROCEDURE — 93306 TTE W/DOPPLER COMPLETE: CPT

## 2023-02-10 PROCEDURE — 78452 HT MUSCLE IMAGE SPECT MULT: CPT | Mod: 26,,, | Performed by: GENERAL PRACTICE

## 2023-02-10 PROCEDURE — 93018 NUCLEAR STRESS - CARDIOLOGY INTERPRETED (CUPID ONLY): ICD-10-PCS | Mod: ,,, | Performed by: GENERAL PRACTICE

## 2023-02-10 PROCEDURE — 93018 CV STRESS TEST I&R ONLY: CPT | Mod: ,,, | Performed by: GENERAL PRACTICE

## 2023-02-10 PROCEDURE — A9502 TC99M TETROFOSMIN: HCPCS

## 2023-02-10 PROCEDURE — 78452 NUCLEAR STRESS - CARDIOLOGY INTERPRETED (CUPID ONLY): ICD-10-PCS | Mod: 26,,, | Performed by: GENERAL PRACTICE

## 2023-02-10 PROCEDURE — 93016 CV STRESS TEST SUPVJ ONLY: CPT | Mod: ,,, | Performed by: NURSE PRACTITIONER

## 2023-02-10 PROCEDURE — 93306 TTE W/DOPPLER COMPLETE: CPT | Mod: 26,,, | Performed by: GENERAL PRACTICE

## 2023-02-10 PROCEDURE — 93306 ECHO (CUPID ONLY): ICD-10-PCS | Mod: 26,,, | Performed by: GENERAL PRACTICE

## 2023-02-10 RX ORDER — REGADENOSON 0.08 MG/ML
0.4 INJECTION, SOLUTION INTRAVENOUS ONCE
Status: COMPLETED | OUTPATIENT
Start: 2023-02-10 | End: 2023-02-10

## 2023-02-10 RX ADMIN — REGADENOSON 0.4 MG: 0.08 INJECTION, SOLUTION INTRAVENOUS at 08:02

## 2023-02-13 LAB
CV PHARM DOSE: 0.4 MG
CV STRESS BASE HR: 69 BPM
DIASTOLIC BLOOD PRESSURE: 64 MMHG
EJECTION FRACTION- HIGH: 65 %
END DIASTOLIC INDEX-HIGH: 153 ML/M2
END DIASTOLIC INDEX-LOW: 93 ML/M2
END SYSTOLIC INDEX-HIGH: 71 ML/M2
END SYSTOLIC INDEX-LOW: 31 ML/M2
NUC REST DIASTOLIC VOLUME INDEX: 110
NUC REST EJECTION FRACTION: 52
NUC REST SYSTOLIC VOLUME INDEX: 53
NUC STRESS DIASTOLIC VOLUME INDEX: 134
NUC STRESS EJECTION FRACTION: 43 %
NUC STRESS SYSTOLIC VOLUME INDEX: 76
OHS CV CPX 1 MINUTE RECOVERY HEART RATE: 102 BPM
OHS CV CPX 85 PERCENT MAX PREDICTED HEART RATE MALE: 145
OHS CV CPX MAX PREDICTED HEART RATE: 170
OHS CV CPX PATIENT IS FEMALE: 0
OHS CV CPX PATIENT IS MALE: 1
OHS CV CPX PEAK DIASTOLIC BLOOD PRESSURE: 60 MMHG
OHS CV CPX PEAK HEAR RATE: 102 BPM
OHS CV CPX PEAK RATE PRESSURE PRODUCT: NORMAL
OHS CV CPX PEAK SYSTOLIC BLOOD PRESSURE: 100 MMHG
OHS CV CPX PERCENT MAX PREDICTED HEART RATE ACHIEVED: 60
OHS CV CPX RATE PRESSURE PRODUCT PRESENTING: 7176
RETIRED EF AND QEF - SEE NOTES: 53 %
SYSTOLIC BLOOD PRESSURE: 104 MMHG

## 2023-04-13 ENCOUNTER — PATIENT MESSAGE (OUTPATIENT)
Dept: CARDIOLOGY | Facility: CLINIC | Age: 53
End: 2023-04-13
Payer: COMMERCIAL

## 2023-04-25 ENCOUNTER — OFFICE VISIT (OUTPATIENT)
Dept: CARDIOLOGY | Facility: CLINIC | Age: 53
End: 2023-04-25
Payer: COMMERCIAL

## 2023-04-25 VITALS
BODY MASS INDEX: 36.72 KG/M2 | HEART RATE: 97 BPM | OXYGEN SATURATION: 96 % | WEIGHT: 241.5 LBS | SYSTOLIC BLOOD PRESSURE: 131 MMHG | DIASTOLIC BLOOD PRESSURE: 89 MMHG

## 2023-04-25 DIAGNOSIS — I99.8 ISCHEMIA: ICD-10-CM

## 2023-04-25 DIAGNOSIS — I42.7 DILATED CARDIOMYOPATHY SECONDARY TO DRUG: ICD-10-CM

## 2023-04-25 DIAGNOSIS — R73.03 PREDIABETES: Primary | ICD-10-CM

## 2023-04-25 DIAGNOSIS — R53.82 CHRONIC FATIGUE: ICD-10-CM

## 2023-04-25 DIAGNOSIS — G47.33 OSA (OBSTRUCTIVE SLEEP APNEA): ICD-10-CM

## 2023-04-25 DIAGNOSIS — I63.9 CEREBROVASCULAR ACCIDENT (CVA), UNSPECIFIED MECHANISM: ICD-10-CM

## 2023-04-25 DIAGNOSIS — I10 PRIMARY HYPERTENSION: ICD-10-CM

## 2023-04-25 PROCEDURE — 1159F PR MEDICATION LIST DOCUMENTED IN MEDICAL RECORD: ICD-10-PCS | Mod: CPTII,S$GLB,, | Performed by: GENERAL PRACTICE

## 2023-04-25 PROCEDURE — 3008F BODY MASS INDEX DOCD: CPT | Mod: CPTII,S$GLB,, | Performed by: GENERAL PRACTICE

## 2023-04-25 PROCEDURE — 3075F PR MOST RECENT SYSTOLIC BLOOD PRESS GE 130-139MM HG: ICD-10-PCS | Mod: CPTII,S$GLB,, | Performed by: GENERAL PRACTICE

## 2023-04-25 PROCEDURE — 4010F ACE/ARB THERAPY RXD/TAKEN: CPT | Mod: CPTII,S$GLB,, | Performed by: GENERAL PRACTICE

## 2023-04-25 PROCEDURE — 3044F HG A1C LEVEL LT 7.0%: CPT | Mod: CPTII,S$GLB,, | Performed by: GENERAL PRACTICE

## 2023-04-25 PROCEDURE — 3075F SYST BP GE 130 - 139MM HG: CPT | Mod: CPTII,S$GLB,, | Performed by: GENERAL PRACTICE

## 2023-04-25 PROCEDURE — 4010F PR ACE/ARB THEARPY RXD/TAKEN: ICD-10-PCS | Mod: CPTII,S$GLB,, | Performed by: GENERAL PRACTICE

## 2023-04-25 PROCEDURE — 99215 PR OFFICE/OUTPT VISIT, EST, LEVL V, 40-54 MIN: ICD-10-PCS | Mod: S$GLB,,, | Performed by: GENERAL PRACTICE

## 2023-04-25 PROCEDURE — 99999 PR PBB SHADOW E&M-EST. PATIENT-LVL III: ICD-10-PCS | Mod: PBBFAC,,, | Performed by: GENERAL PRACTICE

## 2023-04-25 PROCEDURE — 99999 PR PBB SHADOW E&M-EST. PATIENT-LVL III: CPT | Mod: PBBFAC,,, | Performed by: GENERAL PRACTICE

## 2023-04-25 PROCEDURE — 3079F PR MOST RECENT DIASTOLIC BLOOD PRESSURE 80-89 MM HG: ICD-10-PCS | Mod: CPTII,S$GLB,, | Performed by: GENERAL PRACTICE

## 2023-04-25 PROCEDURE — 1159F MED LIST DOCD IN RCRD: CPT | Mod: CPTII,S$GLB,, | Performed by: GENERAL PRACTICE

## 2023-04-25 PROCEDURE — 1160F PR REVIEW ALL MEDS BY PRESCRIBER/CLIN PHARMACIST DOCUMENTED: ICD-10-PCS | Mod: CPTII,S$GLB,, | Performed by: GENERAL PRACTICE

## 2023-04-25 PROCEDURE — 1160F RVW MEDS BY RX/DR IN RCRD: CPT | Mod: CPTII,S$GLB,, | Performed by: GENERAL PRACTICE

## 2023-04-25 PROCEDURE — 3008F PR BODY MASS INDEX (BMI) DOCUMENTED: ICD-10-PCS | Mod: CPTII,S$GLB,, | Performed by: GENERAL PRACTICE

## 2023-04-25 PROCEDURE — 3079F DIAST BP 80-89 MM HG: CPT | Mod: CPTII,S$GLB,, | Performed by: GENERAL PRACTICE

## 2023-04-25 PROCEDURE — 3044F PR MOST RECENT HEMOGLOBIN A1C LEVEL <7.0%: ICD-10-PCS | Mod: CPTII,S$GLB,, | Performed by: GENERAL PRACTICE

## 2023-04-25 PROCEDURE — 99215 OFFICE O/P EST HI 40 MIN: CPT | Mod: S$GLB,,, | Performed by: GENERAL PRACTICE

## 2023-04-25 RX ORDER — SODIUM CHLORIDE 9 MG/ML
INJECTION, SOLUTION INTRAVENOUS ONCE
Status: CANCELLED | OUTPATIENT
Start: 2023-04-25 | End: 2023-04-25

## 2023-04-25 RX ORDER — ASPIRIN 81 MG/1
81 TABLET ORAL DAILY
COMMUNITY

## 2023-04-25 RX ORDER — SODIUM CHLORIDE 0.9 % (FLUSH) 0.9 %
3 SYRINGE (ML) INJECTION EVERY 6 HOURS
Status: CANCELLED | OUTPATIENT
Start: 2023-04-25

## 2023-04-25 NOTE — H&P (VIEW-ONLY)
Subjective:    Patient ID:  Uday Ladd is a 52 y.o. male who presents for follow-up of No chief complaint on file.      HPI:         Abnormal myocardial perfusion scan.    There is a mostly fixed perfusion abnormality with some reversibilty in the inferoapical wall(s).    The gated perfusion images showed an ejection fraction of 52% at rest. The gated perfusion images showed an ejection fraction of 43% post stress. Normal ejection fraction is greater than 53%.    The ECG portion of the study is negative for ischemia.    The patient reported chest pain during the stress test.    There were no arrhythmias during stress.    Tid 1.36    Primary hypertension     2. Paroxysmal atrial fibrillation    3. Congestive cardiomyopathy    4. Cerebrovascular accident (CVA), unspecified mechanism    5. IVCD (intraventricular conduction defect)    6. BMI 33.0-33.9,adult       Plan:      Problem List Items Addressed This Visit    None  Visit Diagnoses         Primary hypertension    -  Primary     Paroxysmal atrial fibrillation         Congestive cardiomyopathy         Cerebrovascular accident (CVA), unspecified mechanism         IVCD (intraventricular conduction defect)         BMI 33.0-33.9,adult                 Cath [place]  Stress , echo BNP  OMEPRAZOLE  No follow-ups on file.     The patients questions were answered, they verbalized understanding, and agreed with the treatment plan.      SILVA PATTERSON MD  [facility]       History of infectious AFIB and CVA, CMO, CHF. He had restoration of NSR and LVEF.   Had basal cell carcinoma on chest removed and one on face next month.          Echo for LV function baseline. Last ECHO ,<- 1 year.   6 month bloodwork.     Cardiomyopathy ,TACHYCARDIC SECONDARY TO ATRIAL FIBRILLATION with embolic CVA.  FC 1  No CHF, CP     CHADS VASC 2 =4 DISCUSSED NOAC with patient. He doesn't want to restart new medicines.     Procedures performed:   -  Right coronary angiography.    -  Left coronary angiography.   -  Left heart catheterization with ventriculography.     HISTORY: No history of previous myocardial infarction. The patient has   hypertension and a family history of coronary artery disease. PRIOR   CARDIOVASCULAR PROCEDURES: No history of valve surgery, coronary or graft   percutaneous intervention, or coronary bypass surgery.     PRIOR DIAGNOSTIC TEST RESULTS: No prior stress test is available.     HEMODYNAMICS: Hemodynamic assessment demonstrates normal LVEDP.     VENTRICLES: There was no diagnostic evidence of left ventricular regional wall   motion abnormalities. Analysis of regional contractile function demonstrated   apical septal hypokinesis. Global left ventricular function was mildly to   moderately depressed. EF calculated by contrast ventriculography was 35 % and   EF estimated was 40 %. The left ventricle was normal in size.     VALVES: AORTIC VALVE: No significant aortic valve gradient. MITRAL VALVE: The   mitral valve exhibited no regurgitation.     CORONARY CIRCULATION: The coronary circulation is left dominant. There was no   angiographic evidence for occlusive coronary artery disease.   --  Left main: The vessel was medium sized. Angiography showed vessel patency.   --  LAD: Normal. The vessel was medium to large sized. Angiography showed   vessel patency. --  Circumflex: Normal. The vessel was medium sized.   Angiography showed vessel patency.   --  RCA: Normal. The vessel was small sized. Angiography showed vessel patency.     AORTA: The root exhibited normal size.     PROCEDURE: The risks and alternatives of the procedures and moderate sedation   were explained to the patient and informed consent was obtained. The patient   was brought to the cath lab and placed on the table. The planned puncture sites   were prepped and draped in the usual sterile fashion. Cardiac catheterization   performed urgently.     --  Right femoral artery access. The puncture site was  infiltrated with 2 %   lidocaine. The vessel was accessed using the modified Seldinger technique, a   wire was threaded into the vessel, and a sheath was advanced over the wire into   the vessel.     --  Right coronary artery angiography. A catheter was advanced to the aorta and   positioned in the vessel ostium under fluoroscopic guidance. Angiography was   performed in multiple projections using power-assisted injection of contrast.     --  Left coronary artery angiography. A catheter was advanced to the aorta and   positioned in the vessel ostium under fluoroscopic guidance. Angiography was   performed in multiple projections using power-assisted injection of contrast.     --  Left heart catheterization. A Pigtail catheter was advanced to the   ascending aorta. After recording ascending aortic pressure, the catheter was   advanced across the aortic valve and left ventricular pressure was recorded.   Ventriculography was performed using power injection of contrast agent. Imaging   was performed using an CUBA projection.     HEMOSTASIS: The sheath was removed. A sealant ( Angioseal) was used. Hemostasis   was successful.     COMPLICATIONS:   No complications occurred during the cath lab visit.     PROCEDURE COMPLETION: The patient tolerated the procedure well. No mechanical   ventricular support was required. RADIATION EXPOSURE: Fluoroscopy time: 3 min.   CONTRAST GIVEN: 157 ml Diagnostic Visapaque.     CONCLUSIONS:     --  SUMMARY:   --  Continue medical management. Minimal evidence of apical nonmobile thrombus   noted. 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   Overall EF IMPROVED FROM PREVIOUS ECHO.     --  CARDIAC STRUCTURES:   --  Analysis of regional contractile function demonstrated apical septal   hypokinesis. --  Global left ventricular function was mildly to moderately   depressed. EF calculated by contrast ventriculography  was 35 % and EF estimated   was 40 %.     Prepared and signed by     Librado Burch MD   Signed 11/06/2015 16:17:02        Review of patient's allergies indicates:  No Known Allergies    No past medical history on file.  No past surgical history on file.  Social History     Tobacco Use    Smoking status: Never    Smokeless tobacco: Never   Substance Use Topics    Alcohol use: Yes    Drug use: Never     No family history on file.     Review of Systems:   Constitution: Negative for diaphoresis and fever.   HEENT: Negative for nosebleeds.    Cardiovascular: Negative for chest pain       No dyspnea on exertion       No leg swelling        No palpitations  Respiratory: Negative for shortness of breath and wheezing.    Hematologic/Lymphatic: Negative for bleeding problem. Does not bruise/bleed easily.   Skin: Negative for color change and rash.   Musculoskeletal: Negative for falls and myalgias.   Gastrointestinal: Negative for hematemesis and hematochezia.   Genitourinary: Negative for hematuria.   Neurological: Negative for dizziness and light-headedness.   Psychiatric/Behavioral: Negative for altered mental status and memory loss.          Objective:        There were no vitals filed for this visit.    Lab Results   Component Value Date    WBC 6.51 01/18/2023    HGB 14.8 01/18/2023    HCT 45.8 01/18/2023     01/18/2023    CHOL 176 01/18/2023    TRIG 132 01/18/2023    HDL 48 01/18/2023    ALT 35 01/18/2023    AST 21 01/18/2023     01/18/2023    K 4.5 01/18/2023     01/18/2023    CREATININE 1.0 01/18/2023    BUN 15 01/18/2023    CO2 26 01/18/2023    TSH 3.433 01/18/2023    HGBA1C 6.2 (H) 01/18/2023        ECHOCARDIOGRAM RESULTS  Results for orders placed during the hospital encounter of 02/10/23    Echo Saline Bubble? No    Interpretation Summary  · The left ventricle is normal in size with concentric remodeling and moderately decreased systolic function.  · There is abnormal septal wall motion.  · The  estimated ejection fraction is 35%.  · Grade I left ventricular diastolic dysfunction.  · The estimated PA systolic pressure is 17 mmHg.  · Normal right ventricular size with normal right ventricular systolic function.  · Normal central venous pressure (3 mmHg).        CURRENT/PREVIOUS VISIT EKG  Results for orders placed or performed in visit on 01/25/23   IN OFFICE EKG 12-LEAD (to [place])    Collection Time: 01/25/23  3:52 PM    Narrative    Test Reason : I48.0,    Vent. Rate : 088 BPM     Atrial Rate : 088 BPM     P-R Int : 182 ms          QRS Dur : 126 ms      QT Int : 360 ms       P-R-T Axes : 058 008 012 degrees     QTc Int : 435 ms    Normal sinus rhythm with sinus arrhythmia  Nonspecific intraventricular block  Nonspecific ST and T wave abnormality  Abnormal ECG  When compared with ECG of 03-JAN-2022 14:38,  The axis Shifted right  Non-specific change in ST segment in Inferior leads  Confirmed by Isiah Atkins MD ([id number]) on 2/22/2023 3:19:43 PM    Referred By:             Confirmed By:Isiah Atkins MD     No valid procedures specified.   Results for orders placed during the hospital encounter of 02/10/23    Nuclear Stress - Cardiology Interpreted    Interpretation Summary    Abnormal myocardial perfusion scan.    There is a mostly fixed perfusion abnormality with some reversibilty in the inferoapical wall(s).    The gated perfusion images showed an ejection fraction of 52% at rest. The gated perfusion images showed an ejection fraction of 43% post stress. Normal ejection fraction is greater than 53%.    The ECG portion of the study is negative for ischemia.    The patient reported chest pain during the stress test.    There were no arrhythmias during stress.    Tid 1.36      Physical Exam:  CONSTITUTIONAL: No fever, no chills  HEENT: Normocephalic, atraumatic,pupils reactive to light                 NECK:  No JVD no carotid bruit  CVS: S1S2+, RRR, no murmurs,   LUNGS: Clear  ABDOMEN: Soft, NT,  BS+  EXTREMITIES: No cyanosis, edema  : No rivera catheter  NEURO: AAO X 3  PSY: Normal affect      Medication List with Changes/Refills   Current Medications    ASCORBIC ACID, VITAMIN C, (VITAMIN C) 100 MG TABLET    Take 100 mg by mouth once daily.    ASPIRIN 325 MG TABLET    Take 325 mg by mouth once daily.    CALCIUM CARBONATE/VITAMIN D3 (VITAMIN D-3 ORAL)    Take 5,000 each by mouth once daily.    CARVEDILOL (COREG) 6.25 MG TABLET    TAKE 1 TABLET BY MOUTH TWICE A DAY    CO-ENZYME Q-10 30 MG CAPSULE    Take 3 capsules (90 mg total) by mouth once daily.    CYANOCOBALAMIN (VITAMIN B-12) 1000 MCG TABLET    Take 100 mcg by mouth once daily.    ENTRESTO 49-51 MG PER TABLET    TAKE 1 TABLET BY MOUTH TWICE A DAY    FUROSEMIDE (LASIX) 40 MG TABLET    TAKE 1 TABLET BY MOUTH TWICE A DAY    MELOXICAM (MOBIC) 15 MG TABLET    15 mg.    MOMETASONE (ELOCON) 0.1 % OINTMENT    Apply topically once daily. APPL Y TO AFFECTED AREA prn    MULTIVITAMIN (THERAGRAN) PER TABLET    Take 1 tablet by mouth once daily.    OMEPRAZOLE (PRILOSEC) 40 MG CAPSULE    TAKE 1 CAPSULE (40 MG TOTAL) BY MOUTH EVERY MORNING. **DRUG NOT COVERED**    ROSUVASTATIN (CRESTOR) 40 MG TAB    TAKE 1 TABLET BY MOUTH EVERY DAY IN THE EVENING    SPIRONOLACTONE (ALDACTONE) 50 MG TABLET    TAKE 1 TABLET BY MOUTH EVERY DAY             Assessment:       No diagnosis found.     Plan:     Problem List Items Addressed This Visit    None      No follow-ups on file.    The patients questions were answered, they verbalized understanding, and agreed with the treatment plan.     SILVA PATTERSON MD  [facility]

## 2023-04-25 NOTE — PROGRESS NOTES
Subjective:    Patient ID:  Uday Ldad is a 52 y.o. male who presents for follow-up of No chief complaint on file.      HPI:         Abnormal myocardial perfusion scan.    There is a mostly fixed perfusion abnormality with some reversibilty in the inferoapical wall(s).    The gated perfusion images showed an ejection fraction of 52% at rest. The gated perfusion images showed an ejection fraction of 43% post stress. Normal ejection fraction is greater than 53%.    The ECG portion of the study is negative for ischemia.    The patient reported chest pain during the stress test.    There were no arrhythmias during stress.    Tid 1.36    Primary hypertension     2. Paroxysmal atrial fibrillation    3. Congestive cardiomyopathy    4. Cerebrovascular accident (CVA), unspecified mechanism    5. IVCD (intraventricular conduction defect)    6. BMI 33.0-33.9,adult       Plan:      Problem List Items Addressed This Visit    None  Visit Diagnoses         Primary hypertension    -  Primary     Paroxysmal atrial fibrillation         Congestive cardiomyopathy         Cerebrovascular accident (CVA), unspecified mechanism         IVCD (intraventricular conduction defect)         BMI 33.0-33.9,adult                 Cath [place]  Stress , echo BNP  OMEPRAZOLE  No follow-ups on file.     The patients questions were answered, they verbalized understanding, and agreed with the treatment plan.      SILVA PATTERSON MD  [facility]       History of infectious AFIB and CVA, CMO, CHF. He had restoration of NSR and LVEF.   Had basal cell carcinoma on chest removed and one on face next month.          Echo for LV function baseline. Last ECHO ,<- 1 year.   6 month bloodwork.     Cardiomyopathy ,TACHYCARDIC SECONDARY TO ATRIAL FIBRILLATION with embolic CVA.  FC 1  No CHF, CP     CHADS VASC 2 =4 DISCUSSED NOAC with patient. He doesn't want to restart new medicines.     Procedures performed:   -  Right coronary angiography.    -  Left coronary angiography.   -  Left heart catheterization with ventriculography.     HISTORY: No history of previous myocardial infarction. The patient has   hypertension and a family history of coronary artery disease. PRIOR   CARDIOVASCULAR PROCEDURES: No history of valve surgery, coronary or graft   percutaneous intervention, or coronary bypass surgery.     PRIOR DIAGNOSTIC TEST RESULTS: No prior stress test is available.     HEMODYNAMICS: Hemodynamic assessment demonstrates normal LVEDP.     VENTRICLES: There was no diagnostic evidence of left ventricular regional wall   motion abnormalities. Analysis of regional contractile function demonstrated   apical septal hypokinesis. Global left ventricular function was mildly to   moderately depressed. EF calculated by contrast ventriculography was 35 % and   EF estimated was 40 %. The left ventricle was normal in size.     VALVES: AORTIC VALVE: No significant aortic valve gradient. MITRAL VALVE: The   mitral valve exhibited no regurgitation.     CORONARY CIRCULATION: The coronary circulation is left dominant. There was no   angiographic evidence for occlusive coronary artery disease.   --  Left main: The vessel was medium sized. Angiography showed vessel patency.   --  LAD: Normal. The vessel was medium to large sized. Angiography showed   vessel patency. --  Circumflex: Normal. The vessel was medium sized.   Angiography showed vessel patency.   --  RCA: Normal. The vessel was small sized. Angiography showed vessel patency.     AORTA: The root exhibited normal size.     PROCEDURE: The risks and alternatives of the procedures and moderate sedation   were explained to the patient and informed consent was obtained. The patient   was brought to the cath lab and placed on the table. The planned puncture sites   were prepped and draped in the usual sterile fashion. Cardiac catheterization   performed urgently.     --  Right femoral artery access. The puncture site was  infiltrated with 2 %   lidocaine. The vessel was accessed using the modified Seldinger technique, a   wire was threaded into the vessel, and a sheath was advanced over the wire into   the vessel.     --  Right coronary artery angiography. A catheter was advanced to the aorta and   positioned in the vessel ostium under fluoroscopic guidance. Angiography was   performed in multiple projections using power-assisted injection of contrast.     --  Left coronary artery angiography. A catheter was advanced to the aorta and   positioned in the vessel ostium under fluoroscopic guidance. Angiography was   performed in multiple projections using power-assisted injection of contrast.     --  Left heart catheterization. A Pigtail catheter was advanced to the   ascending aorta. After recording ascending aortic pressure, the catheter was   advanced across the aortic valve and left ventricular pressure was recorded.   Ventriculography was performed using power injection of contrast agent. Imaging   was performed using an CUBA projection.     HEMOSTASIS: The sheath was removed. A sealant ( Angioseal) was used. Hemostasis   was successful.     COMPLICATIONS:   No complications occurred during the cath lab visit.     PROCEDURE COMPLETION: The patient tolerated the procedure well. No mechanical   ventricular support was required. RADIATION EXPOSURE: Fluoroscopy time: 3 min.   CONTRAST GIVEN: 157 ml Diagnostic Visapaque.     CONCLUSIONS:     --  SUMMARY:   --  Continue medical management. Minimal evidence of apical nonmobile thrombus   noted. 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   Overall EF IMPROVED FROM PREVIOUS ECHO.     --  CARDIAC STRUCTURES:   --  Analysis of regional contractile function demonstrated apical septal   hypokinesis. --  Global left ventricular function was mildly to moderately   depressed. EF calculated by contrast ventriculography  was 35 % and EF estimated   was 40 %.     Prepared and signed by     Librado Burch MD   Signed 11/06/2015 16:17:02        Review of patient's allergies indicates:  No Known Allergies    No past medical history on file.  No past surgical history on file.  Social History     Tobacco Use    Smoking status: Never    Smokeless tobacco: Never   Substance Use Topics    Alcohol use: Yes    Drug use: Never     No family history on file.     Review of Systems:   Constitution: Negative for diaphoresis and fever.   HEENT: Negative for nosebleeds.    Cardiovascular: Negative for chest pain       No dyspnea on exertion       No leg swelling        No palpitations  Respiratory: Negative for shortness of breath and wheezing.    Hematologic/Lymphatic: Negative for bleeding problem. Does not bruise/bleed easily.   Skin: Negative for color change and rash.   Musculoskeletal: Negative for falls and myalgias.   Gastrointestinal: Negative for hematemesis and hematochezia.   Genitourinary: Negative for hematuria.   Neurological: Negative for dizziness and light-headedness.   Psychiatric/Behavioral: Negative for altered mental status and memory loss.          Objective:        There were no vitals filed for this visit.    Lab Results   Component Value Date    WBC 6.51 01/18/2023    HGB 14.8 01/18/2023    HCT 45.8 01/18/2023     01/18/2023    CHOL 176 01/18/2023    TRIG 132 01/18/2023    HDL 48 01/18/2023    ALT 35 01/18/2023    AST 21 01/18/2023     01/18/2023    K 4.5 01/18/2023     01/18/2023    CREATININE 1.0 01/18/2023    BUN 15 01/18/2023    CO2 26 01/18/2023    TSH 3.433 01/18/2023    HGBA1C 6.2 (H) 01/18/2023        ECHOCARDIOGRAM RESULTS  Results for orders placed during the hospital encounter of 02/10/23    Echo Saline Bubble? No    Interpretation Summary  · The left ventricle is normal in size with concentric remodeling and moderately decreased systolic function.  · There is abnormal septal wall motion.  · The  estimated ejection fraction is 35%.  · Grade I left ventricular diastolic dysfunction.  · The estimated PA systolic pressure is 17 mmHg.  · Normal right ventricular size with normal right ventricular systolic function.  · Normal central venous pressure (3 mmHg).        CURRENT/PREVIOUS VISIT EKG  Results for orders placed or performed in visit on 01/25/23   IN OFFICE EKG 12-LEAD (to [place])    Collection Time: 01/25/23  3:52 PM    Narrative    Test Reason : I48.0,    Vent. Rate : 088 BPM     Atrial Rate : 088 BPM     P-R Int : 182 ms          QRS Dur : 126 ms      QT Int : 360 ms       P-R-T Axes : 058 008 012 degrees     QTc Int : 435 ms    Normal sinus rhythm with sinus arrhythmia  Nonspecific intraventricular block  Nonspecific ST and T wave abnormality  Abnormal ECG  When compared with ECG of 03-JAN-2022 14:38,  The axis Shifted right  Non-specific change in ST segment in Inferior leads  Confirmed by Isiah Atkins MD ([id number]) on 2/22/2023 3:19:43 PM    Referred By:             Confirmed By:Isiah Atkins MD     No valid procedures specified.   Results for orders placed during the hospital encounter of 02/10/23    Nuclear Stress - Cardiology Interpreted    Interpretation Summary    Abnormal myocardial perfusion scan.    There is a mostly fixed perfusion abnormality with some reversibilty in the inferoapical wall(s).    The gated perfusion images showed an ejection fraction of 52% at rest. The gated perfusion images showed an ejection fraction of 43% post stress. Normal ejection fraction is greater than 53%.    The ECG portion of the study is negative for ischemia.    The patient reported chest pain during the stress test.    There were no arrhythmias during stress.    Tid 1.36      Physical Exam:  CONSTITUTIONAL: No fever, no chills  HEENT: Normocephalic, atraumatic,pupils reactive to light                 NECK:  No JVD no carotid bruit  CVS: S1S2+, RRR, no murmurs,   LUNGS: Clear  ABDOMEN: Soft, NT,  BS+  EXTREMITIES: No cyanosis, edema  : No rivera catheter  NEURO: AAO X 3  PSY: Normal affect      Medication List with Changes/Refills   Current Medications    ASCORBIC ACID, VITAMIN C, (VITAMIN C) 100 MG TABLET    Take 100 mg by mouth once daily.    ASPIRIN 325 MG TABLET    Take 325 mg by mouth once daily.    CALCIUM CARBONATE/VITAMIN D3 (VITAMIN D-3 ORAL)    Take 5,000 each by mouth once daily.    CARVEDILOL (COREG) 6.25 MG TABLET    TAKE 1 TABLET BY MOUTH TWICE A DAY    CO-ENZYME Q-10 30 MG CAPSULE    Take 3 capsules (90 mg total) by mouth once daily.    CYANOCOBALAMIN (VITAMIN B-12) 1000 MCG TABLET    Take 100 mcg by mouth once daily.    ENTRESTO 49-51 MG PER TABLET    TAKE 1 TABLET BY MOUTH TWICE A DAY    FUROSEMIDE (LASIX) 40 MG TABLET    TAKE 1 TABLET BY MOUTH TWICE A DAY    MELOXICAM (MOBIC) 15 MG TABLET    15 mg.    MOMETASONE (ELOCON) 0.1 % OINTMENT    Apply topically once daily. APPL Y TO AFFECTED AREA prn    MULTIVITAMIN (THERAGRAN) PER TABLET    Take 1 tablet by mouth once daily.    OMEPRAZOLE (PRILOSEC) 40 MG CAPSULE    TAKE 1 CAPSULE (40 MG TOTAL) BY MOUTH EVERY MORNING. **DRUG NOT COVERED**    ROSUVASTATIN (CRESTOR) 40 MG TAB    TAKE 1 TABLET BY MOUTH EVERY DAY IN THE EVENING    SPIRONOLACTONE (ALDACTONE) 50 MG TABLET    TAKE 1 TABLET BY MOUTH EVERY DAY             Assessment:       No diagnosis found.     Plan:     Problem List Items Addressed This Visit    None      No follow-ups on file.    The patients questions were answered, they verbalized understanding, and agreed with the treatment plan.     SILVA PATTERSON MD  [facility]

## 2023-05-09 ENCOUNTER — HOSPITAL ENCOUNTER (OUTPATIENT)
Dept: RADIOLOGY | Facility: HOSPITAL | Age: 53
Discharge: HOME | End: 2023-05-09
Attending: GENERAL PRACTICE
Payer: COMMERCIAL

## 2023-05-09 ENCOUNTER — HOSPITAL ENCOUNTER (OUTPATIENT)
Dept: PREADMISSION TESTING | Facility: HOSPITAL | Age: 53
Discharge: HOME | End: 2023-05-09
Attending: GENERAL PRACTICE
Payer: COMMERCIAL

## 2023-05-09 DIAGNOSIS — I42.7 DILATED CARDIOMYOPATHY SECONDARY TO DRUG: ICD-10-CM

## 2023-05-09 DIAGNOSIS — R05.9 COUGH: ICD-10-CM

## 2023-05-09 DIAGNOSIS — I99.8 ISCHEMIA: ICD-10-CM

## 2023-05-09 LAB
ALBUMIN SERPL BCP-MCNC: 4.1 G/DL (ref 3.5–5.2)
ALP SERPL-CCNC: 53 U/L (ref 55–135)
ALT SERPL W/O P-5'-P-CCNC: 25 U/L (ref 10–44)
ANION GAP SERPL CALC-SCNC: 8 MMOL/L (ref 8–16)
AST SERPL-CCNC: 24 U/L (ref 10–40)
BASOPHILS # BLD AUTO: 0.05 K/UL (ref 0–0.2)
BASOPHILS NFR BLD: 0.8 % (ref 0–1.9)
BILIRUB SERPL-MCNC: 0.5 MG/DL (ref 0.1–1)
BUN SERPL-MCNC: 15 MG/DL (ref 6–20)
CALCIUM SERPL-MCNC: 8.9 MG/DL (ref 8.7–10.5)
CHLORIDE SERPL-SCNC: 106 MMOL/L (ref 95–110)
CO2 SERPL-SCNC: 24 MMOL/L (ref 23–29)
CREAT SERPL-MCNC: 0.9 MG/DL (ref 0.5–1.4)
DIFFERENTIAL METHOD: ABNORMAL
EOSINOPHIL # BLD AUTO: 0.2 K/UL (ref 0–0.5)
EOSINOPHIL NFR BLD: 3 % (ref 0–8)
ERYTHROCYTE [DISTWIDTH] IN BLOOD BY AUTOMATED COUNT: 13.8 % (ref 11.5–14.5)
EST. GFR  (NO RACE VARIABLE): >60 ML/MIN/1.73 M^2
GLUCOSE SERPL-MCNC: 119 MG/DL (ref 70–110)
HCT VFR BLD AUTO: 42.7 % (ref 40–54)
HGB BLD-MCNC: 13.9 G/DL (ref 14–18)
IMM GRANULOCYTES # BLD AUTO: 0.02 K/UL (ref 0–0.04)
IMM GRANULOCYTES NFR BLD AUTO: 0.3 % (ref 0–0.5)
INR PPP: 1 (ref 0.8–1.2)
LYMPHOCYTES # BLD AUTO: 2 K/UL (ref 1–4.8)
LYMPHOCYTES NFR BLD: 33.3 % (ref 18–48)
MCH RBC QN AUTO: 29.4 PG (ref 27–31)
MCHC RBC AUTO-ENTMCNC: 32.6 G/DL (ref 32–36)
MCV RBC AUTO: 90 FL (ref 82–98)
MONOCYTES # BLD AUTO: 0.7 K/UL (ref 0.3–1)
MONOCYTES NFR BLD: 11.9 % (ref 4–15)
NEUTROPHILS # BLD AUTO: 3.1 K/UL (ref 1.8–7.7)
NEUTROPHILS NFR BLD: 50.7 % (ref 38–73)
NRBC BLD-RTO: 0 /100 WBC
PLATELET # BLD AUTO: 311 K/UL (ref 150–450)
PMV BLD AUTO: 10.3 FL (ref 9.2–12.9)
POTASSIUM SERPL-SCNC: 4.2 MMOL/L (ref 3.5–5.1)
PROT SERPL-MCNC: 6.8 G/DL (ref 6–8.4)
PROTHROMBIN TIME: 10.7 SEC (ref 9–12.5)
RBC # BLD AUTO: 4.73 M/UL (ref 4.6–6.2)
SODIUM SERPL-SCNC: 138 MMOL/L (ref 136–145)
WBC # BLD AUTO: 6.07 K/UL (ref 3.9–12.7)

## 2023-05-09 PROCEDURE — 85025 COMPLETE CBC W/AUTO DIFF WBC: CPT | Performed by: GENERAL PRACTICE

## 2023-05-09 PROCEDURE — 93005 ELECTROCARDIOGRAM TRACING: CPT | Performed by: GENERAL PRACTICE

## 2023-05-09 PROCEDURE — 93010 EKG 12-LEAD: ICD-10-PCS | Mod: ,,, | Performed by: GENERAL PRACTICE

## 2023-05-09 PROCEDURE — 93010 ELECTROCARDIOGRAM REPORT: CPT | Mod: ,,, | Performed by: GENERAL PRACTICE

## 2023-05-09 PROCEDURE — 71046 X-RAY EXAM CHEST 2 VIEWS: CPT | Mod: TC

## 2023-05-09 PROCEDURE — 80053 COMPREHEN METABOLIC PANEL: CPT | Performed by: GENERAL PRACTICE

## 2023-05-09 PROCEDURE — 85610 PROTHROMBIN TIME: CPT | Performed by: GENERAL PRACTICE

## 2023-05-09 NOTE — DISCHARGE INSTRUCTIONS
Nothing to eat or drink after midnight the night before your procedure.  Do not take any medications the morning of your procedure  Bring all your medications with you in the original pill bottles from pharmacy.  If you take blood thinners, ask your doctor if you should stop taking them. Aspirin is okay to take.  Do your chlorhexidine wash the night before and morning of your procedure.  Make arrangements for someone you know to drive you home after your procedure. Taxi and Uber are not acceptable.

## 2023-05-11 ENCOUNTER — HOSPITAL ENCOUNTER (OUTPATIENT)
Facility: HOSPITAL | Age: 53
Discharge: HOME | End: 2023-05-11
Attending: GENERAL PRACTICE | Admitting: GENERAL PRACTICE
Payer: COMMERCIAL

## 2023-05-11 VITALS
RESPIRATION RATE: 20 BRPM | OXYGEN SATURATION: 97 % | HEART RATE: 71 BPM | SYSTOLIC BLOOD PRESSURE: 111 MMHG | DIASTOLIC BLOOD PRESSURE: 67 MMHG

## 2023-05-11 DIAGNOSIS — I42.7 DILATED CARDIOMYOPATHY SECONDARY TO DRUG: ICD-10-CM

## 2023-05-11 DIAGNOSIS — I99.8 ISCHEMIA: ICD-10-CM

## 2023-05-11 DIAGNOSIS — I42.0 CONGESTIVE CARDIOMYOPATHY: Primary | ICD-10-CM

## 2023-05-11 DIAGNOSIS — I21.4 NSTEMI (NON-ST ELEVATED MYOCARDIAL INFARCTION): ICD-10-CM

## 2023-05-11 DIAGNOSIS — G47.39 OTHER SLEEP APNEA: Primary | ICD-10-CM

## 2023-05-11 PROCEDURE — C1760 CLOSURE DEV, VASC: HCPCS | Performed by: GENERAL PRACTICE

## 2023-05-11 PROCEDURE — C1894 INTRO/SHEATH, NON-LASER: HCPCS | Performed by: GENERAL PRACTICE

## 2023-05-11 PROCEDURE — 99152 PR MOD CONSCIOUS SEDATION, SAME PHYS, 5+ YRS, FIRST 15 MIN: ICD-10-PCS | Mod: ,,, | Performed by: GENERAL PRACTICE

## 2023-05-11 PROCEDURE — 25000003 PHARM REV CODE 250: Performed by: GENERAL PRACTICE

## 2023-05-11 PROCEDURE — C1769 GUIDE WIRE: HCPCS | Performed by: GENERAL PRACTICE

## 2023-05-11 PROCEDURE — 93460 R&L HRT ART/VENTRICLE ANGIO: CPT | Performed by: GENERAL PRACTICE

## 2023-05-11 PROCEDURE — 99153 MOD SED SAME PHYS/QHP EA: CPT | Performed by: GENERAL PRACTICE

## 2023-05-11 PROCEDURE — 93460 R&L HRT ART/VENTRICLE ANGIO: CPT | Mod: 26,,, | Performed by: GENERAL PRACTICE

## 2023-05-11 PROCEDURE — C1751 CATH, INF, PER/CENT/MIDLINE: HCPCS | Performed by: GENERAL PRACTICE

## 2023-05-11 PROCEDURE — 93460 PR CATH PLACE/CORON ANGIO, IMG SUPER/INTERP,R&L HRT CATH, L HRT VENTRIC: ICD-10-PCS | Mod: 26,,, | Performed by: GENERAL PRACTICE

## 2023-05-11 PROCEDURE — 99152 MOD SED SAME PHYS/QHP 5/>YRS: CPT | Mod: ,,, | Performed by: GENERAL PRACTICE

## 2023-05-11 PROCEDURE — 63600175 PHARM REV CODE 636 W HCPCS: Performed by: GENERAL PRACTICE

## 2023-05-11 PROCEDURE — 99152 MOD SED SAME PHYS/QHP 5/>YRS: CPT | Performed by: GENERAL PRACTICE

## 2023-05-11 PROCEDURE — 25500020 PHARM REV CODE 255: Performed by: GENERAL PRACTICE

## 2023-05-11 PROCEDURE — C1887 CATHETER, GUIDING: HCPCS | Performed by: GENERAL PRACTICE

## 2023-05-11 DEVICE — ANGIO-SEAL VIP VASCULAR CLOSURE DEVICE
Type: IMPLANTABLE DEVICE | Site: GROIN | Status: FUNCTIONAL
Brand: ANGIO-SEAL

## 2023-05-11 RX ORDER — SODIUM CHLORIDE 9 MG/ML
INJECTION, SOLUTION INTRAVENOUS ONCE
Status: COMPLETED | OUTPATIENT
Start: 2023-05-11 | End: 2023-05-11

## 2023-05-11 RX ORDER — LIDOCAINE HYDROCHLORIDE 10 MG/ML
INJECTION, SOLUTION EPIDURAL; INFILTRATION; INTRACAUDAL; PERINEURAL
Status: DISCONTINUED | OUTPATIENT
Start: 2023-05-11 | End: 2023-05-11 | Stop reason: HOSPADM

## 2023-05-11 RX ORDER — FENTANYL CITRATE 50 UG/ML
INJECTION, SOLUTION INTRAMUSCULAR; INTRAVENOUS
Status: DISCONTINUED | OUTPATIENT
Start: 2023-05-11 | End: 2023-05-11 | Stop reason: HOSPADM

## 2023-05-11 RX ORDER — OMEPRAZOLE 40 MG/1
CAPSULE, DELAYED RELEASE ORAL
Qty: 90 CAPSULE | Refills: 3 | Status: SHIPPED | OUTPATIENT
Start: 2023-05-11

## 2023-05-11 RX ORDER — ACETAMINOPHEN 325 MG/1
650 TABLET ORAL EVERY 4 HOURS PRN
Status: DISCONTINUED | OUTPATIENT
Start: 2023-05-11 | End: 2023-05-11 | Stop reason: HOSPADM

## 2023-05-11 RX ORDER — MIDAZOLAM HYDROCHLORIDE 1 MG/ML
INJECTION INTRAMUSCULAR; INTRAVENOUS
Status: DISCONTINUED | OUTPATIENT
Start: 2023-05-11 | End: 2023-05-11 | Stop reason: HOSPADM

## 2023-05-11 RX ORDER — SODIUM CHLORIDE 9 MG/ML
INJECTION, SOLUTION INTRAVENOUS CONTINUOUS
Status: DISCONTINUED | OUTPATIENT
Start: 2023-05-11 | End: 2023-05-11 | Stop reason: HOSPADM

## 2023-05-11 RX ORDER — SODIUM CHLORIDE 0.9 % (FLUSH) 0.9 %
3 SYRINGE (ML) INJECTION EVERY 6 HOURS
Status: DISCONTINUED | OUTPATIENT
Start: 2023-05-11 | End: 2023-05-11 | Stop reason: HOSPADM

## 2023-05-11 RX ADMIN — SODIUM CHLORIDE: 0.9 INJECTION, SOLUTION INTRAVENOUS at 08:05

## 2023-05-11 NOTE — Clinical Note
- S/p fem-distal bypass EKOS thrombolysis with angioplasty on 05/25/17  - Vascular reviewing care for surgical option  - Continue ASA, Statin, and Eliquis     The sheath was removed from the right femoral artery retrograde.

## 2023-05-11 NOTE — Clinical Note
The left ventricle was injected. The injected rate was 15 mL/sec. The total injected volume was 35 mL.

## 2023-05-12 LAB — CATH EF QUANTITATIVE: 20 %

## 2023-05-12 RX ORDER — UBIDECARENONE 30 MG
100 CAPSULE ORAL DAILY
Qty: 90 CAPSULE | Refills: 3 | Status: SHIPPED | OUTPATIENT
Start: 2023-05-12 | End: 2023-08-10

## 2023-05-15 PROBLEM — I63.9 CEREBROVASCULAR ACCIDENT (CVA): Status: RESOLVED | Noted: 2023-02-10 | Resolved: 2023-05-15

## 2023-10-11 DIAGNOSIS — I42.0 CONGESTIVE CARDIOMYOPATHY: Primary | ICD-10-CM

## 2023-10-12 RX ORDER — SPIRONOLACTONE 50 MG/1
TABLET, FILM COATED ORAL
Qty: 90 TABLET | Refills: 3 | Status: SHIPPED | OUTPATIENT
Start: 2023-10-12 | End: 2024-10-11

## 2024-01-11 RX ORDER — FUROSEMIDE 40 MG/1
TABLET ORAL
Qty: 180 TABLET | Refills: 3 | Status: SHIPPED | OUTPATIENT
Start: 2024-01-11

## 2024-04-01 RX ORDER — CARVEDILOL 6.25 MG/1
TABLET ORAL
Qty: 180 TABLET | Refills: 3 | Status: SHIPPED | OUTPATIENT
Start: 2024-04-01

## 2024-05-17 RX ORDER — SACUBITRIL AND VALSARTAN 49; 51 MG/1; MG/1
1 TABLET, FILM COATED ORAL 2 TIMES DAILY
Qty: 180 TABLET | Refills: 3 | Status: SHIPPED | OUTPATIENT
Start: 2024-05-17

## 2024-10-08 NOTE — TELEPHONE ENCOUNTER
----- Message from Viviana sent at 10/8/2024  1:53 PM CDT -----  Contact: pt  Type:  RX Refill Request    Who Called: pt    Refill or New Rx: refill    RX Name and Strength:rosuvastatin (CRESTOR) 40 MG Tab    How is the patient currently taking it? (ex. 1XDay): as directed    Is this a 30 day or 90 day RX: 30    Preferred Pharmacy with phone number:   [facility]/pharmacy #[id number] - [place], [place] - [address] [place] [address] AT [address] [address]  Phone: [phone] Fax: [phone]      Local or Mail Order: local    Ordering Provider: Kiersten    Would the patient rather a call back or a response via [facility]?  Call if questions    Best Call Back Number: [phone]    Additional Information:  pt scheduled for 10/16    Please call to advise    Thanks

## 2024-10-09 DIAGNOSIS — E78.5 HYPERLIPIDEMIA, UNSPECIFIED HYPERLIPIDEMIA TYPE: Primary | ICD-10-CM

## 2024-10-09 RX ORDER — ROSUVASTATIN CALCIUM 40 MG/1
40 TABLET, COATED ORAL NIGHTLY
Qty: 60 TABLET | Refills: 0 | Status: SHIPPED | OUTPATIENT
Start: 2024-10-09

## 2024-10-16 ENCOUNTER — OFFICE VISIT (OUTPATIENT)
Dept: CARDIOLOGY | Facility: CLINIC | Age: 54
End: 2024-10-16
Payer: COMMERCIAL

## 2024-10-16 VITALS
OXYGEN SATURATION: 98 % | HEART RATE: 80 BPM | SYSTOLIC BLOOD PRESSURE: 116 MMHG | BODY MASS INDEX: 37.34 KG/M2 | WEIGHT: 245.56 LBS | DIASTOLIC BLOOD PRESSURE: 82 MMHG

## 2024-10-16 DIAGNOSIS — E78.5 HYPERLIPIDEMIA, UNSPECIFIED HYPERLIPIDEMIA TYPE: ICD-10-CM

## 2024-10-16 DIAGNOSIS — I21.4 NSTEMI (NON-ST ELEVATED MYOCARDIAL INFARCTION): ICD-10-CM

## 2024-10-16 DIAGNOSIS — I42.0 CONGESTIVE CARDIOMYOPATHY: Primary | ICD-10-CM

## 2024-10-16 DIAGNOSIS — I10 PRIMARY HYPERTENSION: ICD-10-CM

## 2024-10-16 DIAGNOSIS — R53.82 CHRONIC FATIGUE: ICD-10-CM

## 2024-10-16 DIAGNOSIS — I42.7 DILATED CARDIOMYOPATHY SECONDARY TO DRUG: ICD-10-CM

## 2024-10-16 DIAGNOSIS — L40.9 PSORIASIS (A TYPE OF SKIN INFLAMMATION): ICD-10-CM

## 2024-10-16 DIAGNOSIS — I63.9 CEREBROVASCULAR ACCIDENT (CVA), UNSPECIFIED MECHANISM: ICD-10-CM

## 2024-10-16 DIAGNOSIS — R73.03 PREDIABETES: ICD-10-CM

## 2024-10-16 DIAGNOSIS — G47.39 OTHER SLEEP APNEA: ICD-10-CM

## 2024-10-16 DIAGNOSIS — I48.0 PAROXYSMAL ATRIAL FIBRILLATION: ICD-10-CM

## 2024-10-16 DIAGNOSIS — E66.01 MORBID OBESITY: ICD-10-CM

## 2024-10-16 PROCEDURE — 99999 PR PBB SHADOW E&M-EST. PATIENT-LVL III: CPT | Mod: PBBFAC,,, | Performed by: GENERAL PRACTICE

## 2024-10-16 RX ORDER — SPIRONOLACTONE 50 MG/1
50 TABLET, FILM COATED ORAL DAILY
Qty: 90 TABLET | Refills: 3 | Status: SHIPPED | OUTPATIENT
Start: 2024-10-16 | End: 2025-10-16

## 2024-10-16 RX ORDER — SACUBITRIL AND VALSARTAN 49; 51 MG/1; MG/1
1 TABLET, FILM COATED ORAL 2 TIMES DAILY
Qty: 180 TABLET | Refills: 3 | Status: SHIPPED | OUTPATIENT
Start: 2024-10-16

## 2024-10-16 RX ORDER — ROSUVASTATIN CALCIUM 40 MG/1
40 TABLET, COATED ORAL NIGHTLY
Qty: 90 TABLET | Refills: 3 | Status: SHIPPED | OUTPATIENT
Start: 2024-10-16

## 2024-10-16 RX ORDER — CARVEDILOL 6.25 MG/1
6.25 TABLET ORAL 2 TIMES DAILY
Qty: 180 TABLET | Refills: 3 | Status: SHIPPED | OUTPATIENT
Start: 2024-10-16

## 2024-10-16 RX ORDER — MELOXICAM 15 MG
15 TABLET ORAL DAILY
COMMUNITY
Start: 2024-06-14 | End: 2024-10-16 | Stop reason: SDUPTHER

## 2024-10-16 RX ORDER — MELOXICAM 7.5 MG/1
15 TABLET ORAL
Qty: 90 TABLET | Refills: 3 | Status: SHIPPED | OUTPATIENT
Start: 2024-10-16 | End: 2025-10-16

## 2024-10-16 RX ORDER — NYSTATIN 100000 [USP'U]/G
POWDER TOPICAL 3 TIMES DAILY
Qty: 60 G | Refills: 4 | Status: SHIPPED | OUTPATIENT
Start: 2024-10-16 | End: 2024-10-30

## 2024-10-16 RX ORDER — MOMETASONE FUROATE 1 MG/G
OINTMENT TOPICAL DAILY
Qty: 45 G | Refills: 2 | Status: SHIPPED | OUTPATIENT
Start: 2024-10-16

## 2024-10-16 RX ORDER — FUROSEMIDE 40 MG/1
40 TABLET ORAL 2 TIMES DAILY
Qty: 180 EACH | Refills: 3 | Status: SHIPPED | OUTPATIENT
Start: 2024-10-16

## 2024-10-16 NOTE — PROGRESS NOTES
Subjective:    Patient ID:  Uday Ladd is a 54 y.o. male who presents for follow-up of   Chief Complaint   Patient presents with    Follow-up     LV 04/25/2023       HPI:      Comes in for followup. CMO.  Has no complaints SOB, PND EDEMA.  GAINED WEIGHT.  HAS KEENAN CAN'T TOLERATE MASK.  No bloodwork. Or echo.          Summary  Show Result ComparisonThe left ventricle is normal in size with concentric remodeling and moderately decreased systolic function.  There is abnormal septal wall motion.  The estimated ejection fraction is 35%.  Grade I left ventricular diastolic dysfunction.  The estimated PA systolic pressure is 17 mmHg.  Normal right ventricular size with normal right ventricular systolic function.  Normal central venous pressure (3 mmHg).      CATH      The ejection fraction was calculated to be 20%.    There was moderate to severe left ventricular systolic dysfunction.    The left ventricular end diastolic pressure was normal.    The pre-procedure left ventricular end diastolic pressure was 12.    The post-procedure left ventricular end diastolic pressure was 15.    The estimated blood loss was none.    The coronary arteries were normal..    There was mild (2+) mitral regurgitation.     The procedure log was documented by Documenter: RT Marj and verified by Librado Burch MD.     Date: 5/11/2023  Time: 1:50 PM       Review of patient's allergies indicates:  No Known Allergies    Past Medical History:   Diagnosis Date    CHF (congestive heart failure)     CVA (cerebral vascular accident)     HTN (hypertension)      Past Surgical History:   Procedure Laterality Date    BACK SURGERY      LEFT HEART CATHETERIZATION Left 5/11/2023    Procedure: Left heart cath;  Surgeon: Librado Burch MD;  Location: [facility] CATH/EP LAB;  Service: Cardiology;  Laterality: Left;    RIGHT HEART CATHETERIZATION Right 5/11/2023    Procedure: INSERTION, CATHETER, RIGHT HEART;  Surgeon: Librado Burch MD;  Location:  [facility] CATH/EP LAB;  Service: Cardiology;  Laterality: Right;    VENTRICULOGRAM, LEFT  5/11/2023    Procedure: Ventriculogram, Left;  Surgeon: Librado Burch MD;  Location: [facility] CATH/EP LAB;  Service: Cardiology;;     Social History     Tobacco Use    Smoking status: Never    Smokeless tobacco: Never   Substance Use Topics    Alcohol use: Yes     Alcohol/week: 1.0 standard drink of alcohol     Types: 1 Cans of beer per week    Drug use: Never     No family history on file.     Review of Systems:   Constitution: Negative for diaphoresis and fever.   HEENT: Negative for nosebleeds.    Cardiovascular: Negative for chest pain       No dyspnea on exertion       No leg swelling        No palpitations  Respiratory: Negative for shortness of breath and wheezing.    Hematologic/Lymphatic: Negative for bleeding problem. Does not bruise/bleed easily.   Skin: Negative for color change and rash.   Musculoskeletal: Negative for falls and myalgias.   Gastrointestinal: Negative for hematemesis and hematochezia.   Genitourinary: Negative for hematuria.   Neurological: Negative for dizziness and light-headedness.   Psychiatric/Behavioral: Negative for altered mental status and memory loss.          Objective:        Vitals:    10/16/24 1116   BP: 116/82   Pulse: 80       Lab Results   Component Value Date    WBC 6.07 05/09/2023    HGB 13.9 (L) 05/09/2023    HCT 42.7 05/09/2023     05/09/2023    CHOL 176 01/18/2023    TRIG 132 01/18/2023    HDL 48 01/18/2023    ALT 25 05/09/2023    AST 24 05/09/2023     05/09/2023    K 4.2 05/09/2023     05/09/2023    CREATININE 0.9 05/09/2023    BUN 15 05/09/2023    CO2 24 05/09/2023    TSH 3.433 01/18/2023    INR 1.0 05/09/2023    HGBA1C 6.2 (H) 01/18/2023        ECHOCARDIOGRAM RESULTS  Results for orders placed during the hospital encounter of 02/10/23    Echo Saline Bubble? No    Interpretation Summary  · The left ventricle is normal in size with concentric remodeling and  moderately decreased systolic function.  · There is abnormal septal wall motion.  · The estimated ejection fraction is 35%.  · Grade I left ventricular diastolic dysfunction.  · The estimated PA systolic pressure is 17 mmHg.  · Normal right ventricular size with normal right ventricular systolic function.  · Normal central venous pressure (3 mmHg).        CURRENT/PREVIOUS VISIT EKG  Results for orders placed or performed during the hospital encounter of 05/09/23   EKG 12-lead    Collection Time: 05/09/23 11:55 AM    Narrative    Test Reason : I42.7,I99.8,    Vent. Rate : 086 BPM     Atrial Rate : 086 BPM     P-R Int : 170 ms          QRS Dur : 124 ms      QT Int : 384 ms       P-R-T Axes : 038 -53 045 degrees     QTc Int : 459 ms    Normal sinus rhythm  Left anterior fascicular block  Abnormal ECG  When compared with ECG of 25-JAN-2023 15:52,  Left anterior fascicular block is now Present  Confirmed by Kiersten MEJIA, Librado MELENDEZ ([id number]) on 5/11/2023 10:17:47 PM    Referred By:             Confirmed By:Librado Burch MD     No valid procedures specified.   Results for orders placed during the hospital encounter of 02/10/23    Nuclear Stress - Cardiology Interpreted    Interpretation Summary    Abnormal myocardial perfusion scan.    There is a mostly fixed perfusion abnormality with some reversibilty in the inferoapical wall(s).    The gated perfusion images showed an ejection fraction of 52% at rest. The gated perfusion images showed an ejection fraction of 43% post stress. Normal ejection fraction is greater than 53%.    The ECG portion of the study is negative for ischemia.    The patient reported chest pain during the stress test.    There were no arrhythmias during stress.    Tid 1.36      Physical Exam:  CONSTITUTIONAL: No fever, no chills  HEENT: Normocephalic, atraumatic,pupils reactive to light                 NECK:  No JVD no carotid bruit  CVS: S1S2+, RRR, no murmurs,   LUNGS: Clear  ABDOMEN: Soft, NT,  BS+  EXTREMITIES: No cyanosis, edema  : No rivera catheter  NEURO: AAO X 3  PSY: Normal affect      Medication List with Changes/Refills   New Medications    NYSTATIN (MYCOSTATIN) POWDER    Apply topically 3 (three) times daily. for 14 days   Current Medications    ASCORBIC ACID, VITAMIN C, (VITAMIN C) 100 MG TABLET    Take 100 mg by mouth once daily.    ASPIRIN (ECOTRIN) 81 MG EC TABLET    Take 81 mg by mouth once daily.    CALCIUM CARBONATE/VITAMIN D3 (VITAMIN D-3 ORAL)    Take 5,000 each by mouth once daily.    CO-ENZYME Q-10 30 MG CAPSULE    Take 3 capsules (90 mg total) by mouth once daily.    CYANOCOBALAMIN (VITAMIN B-12) 1000 MCG TABLET    Take 100 mcg by mouth once daily.    MULTIVITAMIN (THERAGRAN) PER TABLET    Take 1 tablet by mouth once daily.    OMEPRAZOLE (PRILOSEC) 40 MG CAPSULE    TAKE 1 CAPSULE BY MOUTH EVERY MORNING   Changed and/or Refilled Medications    Modified Medication Previous Medication    CARVEDILOL (COREG) 6.25 MG TABLET carvediloL (COREG) 6.25 MG tablet       Take 1 tablet (6.25 mg total) by mouth 2 (two) times daily.    TAKE 1 TABLET BY MOUTH TWICE A DAY    FUROSEMIDE (LASIX) 40 MG TABLET furosemide (LASIX) 40 MG tablet       Take 1 tablet (40 mg total) by mouth 2 (two) times daily.    TAKE 1 TABLET BY MOUTH TWICE A DAY    MELOXICAM (MOBIC) 7.5 MG TABLET MOBIC 15 mg tablet       Take 2 tablets (15 mg total) by mouth as needed for Pain.    Take 15 mg by mouth once daily.    MOMETASONE (ELOCON) 0.1 % OINTMENT mometasone (ELOCON) 0.1 % ointment       Apply topically once daily. APPL Y TO AFFECTED AREA prn    Apply topically once daily. APPL Y TO AFFECTED AREA prn    ROSUVASTATIN (CRESTOR) 40 MG TAB rosuvastatin (CRESTOR) 40 MG Tab       Take 1 tablet (40 mg total) by mouth every evening.    Take 1 tablet (40 mg total) by mouth every evening.    SACUBITRIL-VALSARTAN (ENTRESTO) 49-51 MG PER TABLET sacubitriL-valsartan (ENTRESTO) 49-51 mg per tablet       Take 1 tablet by mouth 2 (two)  times daily.    Take 1 tablet by mouth 2 (two) times daily.    SPIRONOLACTONE (ALDACTONE) 50 MG TABLET spironolactone (ALDACTONE) 50 MG tablet       Take 1 tablet (50 mg total) by mouth once daily.    TAKE 1 TABLET BY MOUTH EVERY DAY             Assessment:       1. 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 NSTEMI (non-ST elevated myocardial infarction)    4. Prediabetes    5. Hyperlipidemia, unspecified hyperlipidemia type    6. Primary hypertension    7. BMI 33.0-33.9,adult    8. Chronic fatigue    9. Other sleep apnea    10. Psoriasis (a type of skin inflammation)    11. Morbid obesity    12. Paroxysmal atrial fibrillation    13. Cerebrovascular accident (CVA), unspecified mechanism         Plan:     Problem List Items Addressed This Visit          Cardiac/Vascular    Primary hypertension    Paroxysmal atrial fibrillation    Congestive cardiomyopathy - Primary    Relevant Medications    carvediloL (COREG) 6.25 MG tablet    furosemide (LASIX) 40 MG tablet    sacubitriL-valsartan (ENTRESTO) 49-51 mg per tablet    spironolactone (ALDACTONE) 50 MG tablet    Other Relevant Orders    IN OFFICE EKG 12-LEAD (to [place])    Echo Saline Bubble? 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 No    Lipid Panel    Vitamin D    TSH    CBC Without Differential    Comprehensive Metabolic Panel    Hemoglobin A1C    NSTEMI (non-ST elevated myocardial infarction)        Relevant Medications    carvediloL (COREG) 6.25 MG tablet    furosemide  (LASIX) 40 MG tablet    sacubitriL-valsartan (ENTRESTO) 49-51 mg per tablet    spironolactone (ALDACTONE) 50 MG tablet    Other Relevant Orders    IN OFFICE EKG 12-LEAD (to [place])    Lipid Panel    Vitamin D    TSH    CBC Without Differential    Comprehensive Metabolic Panel    Hemoglobin A1C    Prediabetes        Relevant Orders    Lipid Panel    Vitamin D    TSH    CBC Without Differential    Comprehensive Metabolic Panel    Hemoglobin A1C    Hyperlipidemia, unspecified hyperlipidemia type        Relevant Medications    rosuvastatin (CRESTOR) 40 MG Tab    Chronic fatigue        Relevant Orders    Lipid Panel    Vitamin D    TSH    CBC Without Differential    Comprehensive Metabolic Panel    Hemoglobin A1C    Other sleep apnea        Relevant Orders    Lipid Panel    Vitamin D    TSH    CBC Without Differential    Comprehensive Metabolic Panel    Hemoglobin A1C    Psoriasis (a type of skin inflammation)        Relevant Medications    mometasone (ELOCON) 0.1 % ointment    Other Relevant Orders    Lipid Panel    Vitamin D    TSH    CBC Without Differential    Comprehensive Metabolic Panel    Hemoglobin A1C    Morbid obesity        Cerebrovascular accident (CVA), unspecified mechanism               Cardiomyopathy   Recheck ECHO.  CONTINUE GDMT    PREDIABETES CHECK LABS.    OBESITY CONSIDER GLP1    Hx AFIB CVA    HTN CONTROLLED      EKG NSR LAD  CAN'T RO OLD ANT MI    Follow up in about 3 months (around 1/16/2025).    The patients questions were answered, they verbalized understanding, and agreed with the treatment plan.     SILVA PATTERSON MD  [facility]

## 2025-06-10 DIAGNOSIS — I42.7 DILATED CARDIOMYOPATHY SECONDARY TO DRUG: ICD-10-CM

## 2025-06-12 RX ORDER — MELOXICAM 15 MG/1
15 TABLET ORAL
Qty: 30 TABLET | Refills: 11 | OUTPATIENT
Start: 2025-06-12 | End: 2026-06-12

## (undated) DEVICE — Device

## (undated) DEVICE — GUIDEWIRE DOUBLE ENDED .035 DIA. 150CML

## (undated) DEVICE — SHEATH PINNACLE 8FRX10CM W/GUIDEWIRE

## (undated) DEVICE — CATHETER DIAGNOSTIC DXTERITY 6FR JL 4

## (undated) DEVICE — CATHETER DIAGNOSTIC DXTERITY 6F PIGST

## (undated) DEVICE — SHEATH PINNACLE 6FRX10CM W/GUIDEWIRE

## (undated) DEVICE — CATHETER DIAGNOSTIC DXTERITY 6FR JR 4.0